# Patient Record
Sex: FEMALE | Race: WHITE | NOT HISPANIC OR LATINO | Employment: FULL TIME | URBAN - METROPOLITAN AREA
[De-identification: names, ages, dates, MRNs, and addresses within clinical notes are randomized per-mention and may not be internally consistent; named-entity substitution may affect disease eponyms.]

---

## 2019-12-12 ENCOUNTER — OFFICE VISIT (OUTPATIENT)
Dept: INTERNAL MEDICINE CLINIC | Facility: CLINIC | Age: 49
End: 2019-12-12
Payer: COMMERCIAL

## 2019-12-12 VITALS
TEMPERATURE: 99.2 F | DIASTOLIC BLOOD PRESSURE: 84 MMHG | HEIGHT: 65 IN | OXYGEN SATURATION: 96 % | HEART RATE: 104 BPM | SYSTOLIC BLOOD PRESSURE: 120 MMHG | BODY MASS INDEX: 29.99 KG/M2 | WEIGHT: 180 LBS

## 2019-12-12 DIAGNOSIS — N90.7 EPIDERMOID CYST OF SKIN OF VULVA: Primary | ICD-10-CM

## 2019-12-12 DIAGNOSIS — J01.00 ACUTE NON-RECURRENT MAXILLARY SINUSITIS: ICD-10-CM

## 2019-12-12 PROCEDURE — 99202 OFFICE O/P NEW SF 15 MIN: CPT | Performed by: INTERNAL MEDICINE

## 2019-12-12 RX ORDER — CEPHALEXIN 500 MG/1
500 CAPSULE ORAL EVERY 6 HOURS SCHEDULED
Qty: 40 CAPSULE | Refills: 0 | Status: SHIPPED | OUTPATIENT
Start: 2019-12-12 | End: 2019-12-22

## 2019-12-12 NOTE — PATIENT INSTRUCTIONS
Problem List Items Addressed This Visit        Respiratory    Acute non-recurrent maxillary sinusitis     Will treat with Keflex 500 mg every 6 hours for 10 days  Recommend continuing over-the-counter second-generation antihistamine and start a steroidal nasal spray and a decongestant  Discussed steam inhalation  She is to contact office for worsening symptoms  Genitourinary    Epidermoid cyst of skin of vulva - Primary     Will treat with Keflex 500 mg to be taken every 6 hours for 10 days  Cyst ruptured  Discussed local wound care

## 2019-12-12 NOTE — PROGRESS NOTES
Assessment/Plan:    Acute non-recurrent maxillary sinusitis  Will treat with Keflex 500 mg every 6 hours for 10 days  Recommend continuing over-the-counter second-generation antihistamine and start a steroidal nasal spray and a decongestant  Discussed steam inhalation  She is to contact office for worsening symptoms  Epidermoid cyst of skin of vulva  Will treat with Keflex 500 mg to be taken every 6 hours for 10 days  Cyst ruptured  Discussed local wound care  Diagnoses and all orders for this visit:    Epidermoid cyst of skin of vulva  -     cephalexin (KEFLEX) 500 mg capsule; Take 1 capsule (500 mg total) by mouth every 6 (six) hours for 10 days    Acute non-recurrent maxillary sinusitis  -     cephalexin (KEFLEX) 500 mg capsule; Take 1 capsule (500 mg total) by mouth every 6 (six) hours for 10 days                Subjective:      Patient ID: Eunice Client is a 52 y o  female  Chief Complaint   Patient presents with    Sinusitis     New patient acute issues - C/O sinus headaches and face pain, coughing     Recurrent Skin Infections     in groij area wants looked at due to pressure and discomfort       52year old female is seen today with concern for acute sinusitis of just over 1 week duration  She also reports having a boil of her groin area that occurs frequently  The area is sore, denies any active drainage  She has needed I&D in the past and requiring antibiotics  Sinusitis   This is a new problem  The current episode started 1 to 4 weeks ago  The problem is unchanged  There has been no fever  She is experiencing no pain  Associated symptoms include congestion, coughing, headaches and sinus pressure  Pertinent negatives include no chills, diaphoresis, ear pain, hoarse voice, neck pain, shortness of breath, sneezing, sore throat or swollen glands  Past treatments include oral decongestants  The treatment provided mild relief         The following portions of the patient's history were reviewed and updated as appropriate: allergies, current medications, past family history, past medical history, past social history, past surgical history and problem list     Review of Systems   Constitutional: Negative for activity change, appetite change, chills, diaphoresis, fatigue and fever  HENT: Positive for congestion and sinus pressure  Negative for ear pain, hoarse voice, postnasal drip, rhinorrhea, sinus pain, sneezing and sore throat  Eyes: Negative for visual disturbance  Respiratory: Positive for cough  Negative for apnea, choking, chest tightness, shortness of breath and wheezing  Cardiovascular: Negative for chest pain, palpitations and leg swelling  Gastrointestinal: Negative for abdominal distention, abdominal pain, anal bleeding, blood in stool, constipation, diarrhea, nausea and vomiting  Endocrine: Negative for cold intolerance and heat intolerance  Genitourinary: Negative for difficulty urinating, dysuria and hematuria  Musculoskeletal: Negative  Negative for neck pain  Skin: Positive for wound  Neurological: Positive for headaches  Negative for dizziness, weakness, light-headedness and numbness  Hematological: Negative for adenopathy  Psychiatric/Behavioral: Negative for agitation, sleep disturbance and suicidal ideas  All other systems reviewed and are negative          Past Medical History:   Diagnosis Date    Anxiety     Asthma          Current Outpatient Medications:     cephalexin (KEFLEX) 500 mg capsule, Take 1 capsule (500 mg total) by mouth every 6 (six) hours for 10 days, Disp: 40 capsule, Rfl: 0    No Known Allergies    Social History   Past Surgical History:   Procedure Laterality Date    WISDOM TOOTH EXTRACTION       Family History   Problem Relation Age of Onset    Diabetes Mother        Objective:  /84 (BP Location: Left arm, Patient Position: Sitting, Cuff Size: Large)   Pulse 104   Temp 99 2 °F (37 3 °C) (Oral)   Ht 5' 5" (1 651 m) Wt 81 6 kg (180 lb)   SpO2 96%   BMI 29 95 kg/m²     No results found for this or any previous visit (from the past 1344 hour(s))  Physical Exam   Constitutional: She is oriented to person, place, and time  She appears well-developed and well-nourished  No distress  HENT:   Head: Normocephalic and atraumatic  Nose: Mucosal edema and rhinorrhea present  Mouth/Throat: Posterior oropharyngeal erythema present  Eyes: Pupils are equal, round, and reactive to light  Conjunctivae and EOM are normal  Right eye exhibits no discharge  Left eye exhibits no discharge  Neck: Normal range of motion  Neck supple  No JVD present  No thyromegaly present  Cardiovascular: Normal rate, regular rhythm, normal heart sounds and intact distal pulses  Exam reveals no gallop and no friction rub  No murmur heard  Pulmonary/Chest: Effort normal and breath sounds normal  No respiratory distress  She has no wheezes  She has no rales  She exhibits no tenderness  Abdominal: Soft  She exhibits no distension  There is no tenderness  Genitourinary:         Musculoskeletal: Normal range of motion  She exhibits no edema, tenderness or deformity  Lymphadenopathy:     She has no cervical adenopathy  Neurological: She is alert and oriented to person, place, and time  No cranial nerve deficit  Coordination normal    Skin: Skin is warm and dry  No rash noted  She is not diaphoretic  No erythema  No pallor  Psychiatric: She has a normal mood and affect  Her behavior is normal  Judgment and thought content normal    Nursing note and vitals reviewed

## 2019-12-12 NOTE — LETTER
December 12, 2019     Patient: Willette Kawasaki   YOB: 1970   Date of Visit: 12/12/2019       To Whom it May Concern:    Willette Kawasaki is under my professional care  She was seen in my office on 12/12/2019  She may return to work on 12/16/2019  If you have any questions or concerns, please don't hesitate to call           Sincerely,          Iva Yee MD        CC: No Recipients

## 2019-12-12 NOTE — ASSESSMENT & PLAN NOTE
Will treat with Keflex 500 mg to be taken every 6 hours for 10 days  Cyst ruptured  Discussed local wound care

## 2019-12-12 NOTE — ASSESSMENT & PLAN NOTE
Will treat with Keflex 500 mg every 6 hours for 10 days  Recommend continuing over-the-counter second-generation antihistamine and start a steroidal nasal spray and a decongestant  Discussed steam inhalation  She is to contact office for worsening symptoms

## 2020-02-11 ENCOUNTER — OFFICE VISIT (OUTPATIENT)
Dept: INTERNAL MEDICINE CLINIC | Facility: CLINIC | Age: 50
End: 2020-02-11
Payer: COMMERCIAL

## 2020-02-11 VITALS
OXYGEN SATURATION: 98 % | HEART RATE: 85 BPM | SYSTOLIC BLOOD PRESSURE: 128 MMHG | HEIGHT: 65 IN | WEIGHT: 179.2 LBS | BODY MASS INDEX: 29.85 KG/M2 | DIASTOLIC BLOOD PRESSURE: 86 MMHG | TEMPERATURE: 98 F

## 2020-02-11 DIAGNOSIS — J01.00 ACUTE MAXILLARY SINUSITIS, RECURRENCE NOT SPECIFIED: ICD-10-CM

## 2020-02-11 DIAGNOSIS — Z12.31 ENCOUNTER FOR SCREENING MAMMOGRAM FOR MALIGNANT NEOPLASM OF BREAST: Primary | ICD-10-CM

## 2020-02-11 PROCEDURE — 99213 OFFICE O/P EST LOW 20 MIN: CPT | Performed by: INTERNAL MEDICINE

## 2020-02-11 PROCEDURE — 3008F BODY MASS INDEX DOCD: CPT | Performed by: INTERNAL MEDICINE

## 2020-02-11 RX ORDER — AZITHROMYCIN 250 MG/1
TABLET, FILM COATED ORAL
Qty: 6 TABLET | Refills: 0 | Status: SHIPPED | OUTPATIENT
Start: 2020-02-11 | End: 2020-02-16

## 2020-02-11 NOTE — PROGRESS NOTES
Assessment/Plan:    1  Acute maxillary sinusitis  Will prescribe Z-Jesse  Also advised to continue with antihistamine  Also advised for steam inhalation  Tylenol p r n  For pain fever and discomfort    2  Chronic tobacco abuse  She smokes about a pack per day  Advised to quit smoking  But presently she is not ready  Diagnoses and all orders for this visit:    Encounter for screening mammogram for malignant neoplasm of breast  -     Mammo screening bilateral w 3d & cad; Future    Acute maxillary sinusitis, recurrence not specified  -     azithromycin (ZITHROMAX) 250 mg tablet; Take 2 tablets (500 mg total) by mouth daily for 1 day, THEN 1 tablet (250 mg total) daily for 4 days  BMI Counseling: Body mass index is 29 82 kg/m²  The BMI is above normal  Nutrition recommendations include decreasing portion sizes, encouraging healthy choices of fruits and vegetables, decreasing fast food intake, consuming healthier snacks, limiting drinks that contain sugar, moderation in carbohydrate intake and increasing intake of lean protein  Exercise recommendations include moderate physical activity 150 minutes/week and exercising 3-5 times per week  No pharmacotherapy was ordered  Tobacco Cessation Counseling: Tobacco cessation counseling was not provided  The patient is sincerely urged to quit consumption of tobacco  She is ready to quit tobacco  Medication options and side effects of medication discussed  Patient refused medication  Subjective:          Patient ID: Cristiane Alcantara is a 52 y o  female  Patient came to office with a complain of a cough with greenish sputum  Sinus congestion sore throat and pressure in the ears  She did try over-the-counter cold medicine without any significant relief        The following portions of the patient's history were reviewed and updated as appropriate: allergies, current medications, past family history, past medical history, past social history, past surgical history and problem list     Review of Systems   Constitutional: Negative for fatigue and fever  HENT: Positive for congestion, ear pain and sore throat  Negative for ear discharge, postnasal drip, sinus pressure, tinnitus and trouble swallowing  Eyes: Negative for discharge, itching and visual disturbance  Respiratory: Positive for cough  Negative for shortness of breath  Cardiovascular: Negative for chest pain and palpitations  Gastrointestinal: Negative for abdominal pain, diarrhea, nausea and vomiting  Endocrine: Negative for cold intolerance and polyuria  Genitourinary: Negative for difficulty urinating, dysuria and urgency  Musculoskeletal: Negative for arthralgias and neck pain  Skin: Negative for rash  Allergic/Immunologic: Negative for environmental allergies  Neurological: Negative for dizziness, weakness and headaches  Psychiatric/Behavioral: The patient is not nervous/anxious  Past Medical History:   Diagnosis Date    Anxiety     Asthma          Current Outpatient Medications:     azithromycin (ZITHROMAX) 250 mg tablet, Take 2 tablets (500 mg total) by mouth daily for 1 day, THEN 1 tablet (250 mg total) daily for 4 days  , Disp: 6 tablet, Rfl: 0    Allergies   Allergen Reactions    Keflex [Cephalexin] Diarrhea    Moxifloxacin GI Intolerance       Social History   Past Surgical History:   Procedure Laterality Date    WISDOM TOOTH EXTRACTION       Family History   Problem Relation Age of Onset    Diabetes Mother        Objective:  /86 (BP Location: Left arm, Patient Position: Sitting, Cuff Size: Adult)   Pulse 85   Temp 98 °F (36 7 °C) (Oral)   Ht 5' 5" (1 651 m)   Wt 81 3 kg (179 lb 3 2 oz) Comment: w shoes  SpO2 98% Comment: ra  BMI 29 82 kg/m²   Body mass index is 29 82 kg/m²  Physical Exam   Constitutional: She appears well-developed  HENT:   Head: Normocephalic     Right Ear: External ear normal    Left Ear: External ear normal    Mouth/Throat: Oropharynx is clear and moist    Mild tenderness over bilateral maxillary sinus noted  Mild pharyngeal congestion noted  No exudate  Eyes: Pupils are equal, round, and reactive to light  No scleral icterus  Neck: Normal range of motion  Neck supple  No tracheal deviation present  No thyromegaly present  Cardiovascular: Normal rate, regular rhythm and normal heart sounds  Pulmonary/Chest: Effort normal and breath sounds normal  No respiratory distress  She has no wheezes  She exhibits no tenderness  Abdominal: Soft  Bowel sounds are normal  She exhibits no mass  There is no tenderness  Musculoskeletal: Normal range of motion  Lymphadenopathy:     She has no cervical adenopathy  Neurological: She is alert  No cranial nerve deficit  Skin: Skin is warm  Psychiatric: She has a normal mood and affect

## 2020-02-13 ENCOUNTER — TELEPHONE (OUTPATIENT)
Dept: ADMINISTRATIVE | Facility: OTHER | Age: 50
End: 2020-02-13

## 2020-02-13 NOTE — TELEPHONE ENCOUNTER
----- Message from Frank Powell sent at 2/13/2020  1:49 PM EST -----  Regarding: PAP  02/13/20 1:51 PM    Hello, our patient Abhinav Champion has had Pap Smear (HPV) aka Cervical Cancer Screening completed/performed  Please assist in updating the patient chart by pulling the Care Everywhere (CE) document  The date of service is 2016       Thank you,  Frank Powell  [address]

## 2020-02-13 NOTE — TELEPHONE ENCOUNTER
Upon review of the In Basket request we were able to locate, review, and update the patient chart as requested for Pap Smear (HPV) aka Cervical Cancer Screening  Any additional questions or concerns should be emailed to the Practice Liaisons via [email or web address] email, please do not reply via In Basket      Thank you  Miriam Denson

## 2020-05-19 ENCOUNTER — TELEMEDICINE (OUTPATIENT)
Dept: INTERNAL MEDICINE CLINIC | Facility: CLINIC | Age: 50
End: 2020-05-19
Payer: COMMERCIAL

## 2020-05-19 VITALS — BODY MASS INDEX: 30.82 KG/M2 | HEIGHT: 65 IN | WEIGHT: 185 LBS

## 2020-05-19 DIAGNOSIS — L72.0 EPIDERMOID CYST: Primary | ICD-10-CM

## 2020-05-19 PROBLEM — J01.00 ACUTE NON-RECURRENT MAXILLARY SINUSITIS: Status: RESOLVED | Noted: 2019-12-12 | Resolved: 2020-05-19

## 2020-05-19 PROCEDURE — 3008F BODY MASS INDEX DOCD: CPT | Performed by: INTERNAL MEDICINE

## 2020-05-19 PROCEDURE — 99213 OFFICE O/P EST LOW 20 MIN: CPT | Performed by: INTERNAL MEDICINE

## 2021-01-15 ENCOUNTER — TELEMEDICINE (OUTPATIENT)
Dept: INTERNAL MEDICINE CLINIC | Age: 51
End: 2021-01-15
Payer: COMMERCIAL

## 2021-01-15 ENCOUNTER — TELEPHONE (OUTPATIENT)
Dept: INTERNAL MEDICINE CLINIC | Age: 51
End: 2021-01-15

## 2021-01-15 VITALS — WEIGHT: 185 LBS | BODY MASS INDEX: 30.82 KG/M2 | HEIGHT: 65 IN | TEMPERATURE: 97.5 F

## 2021-01-15 DIAGNOSIS — Z20.822 EXPOSURE TO COVID-19 VIRUS: Primary | ICD-10-CM

## 2021-01-15 DIAGNOSIS — Z20.822 EXPOSURE TO COVID-19 VIRUS: ICD-10-CM

## 2021-01-15 PROCEDURE — 3725F SCREEN DEPRESSION PERFORMED: CPT | Performed by: FAMILY MEDICINE

## 2021-01-15 PROCEDURE — 3008F BODY MASS INDEX DOCD: CPT | Performed by: FAMILY MEDICINE

## 2021-01-15 PROCEDURE — U0003 INFECTIOUS AGENT DETECTION BY NUCLEIC ACID (DNA OR RNA); SEVERE ACUTE RESPIRATORY SYNDROME CORONAVIRUS 2 (SARS-COV-2) (CORONAVIRUS DISEASE [COVID-19]), AMPLIFIED PROBE TECHNIQUE, MAKING USE OF HIGH THROUGHPUT TECHNOLOGIES AS DESCRIBED BY CMS-2020-01-R: HCPCS | Performed by: FAMILY MEDICINE

## 2021-01-15 PROCEDURE — 99213 OFFICE O/P EST LOW 20 MIN: CPT | Performed by: FAMILY MEDICINE

## 2021-01-15 PROCEDURE — U0005 INFEC AGEN DETEC AMPLI PROBE: HCPCS | Performed by: FAMILY MEDICINE

## 2021-01-15 NOTE — PROGRESS NOTES
COVID-19 Virtual Visit     Assessment/Plan:    Problem List Items Addressed This Visit     None      Visit Diagnoses     Exposure to COVID-19 virus    -  Primary    Relevant Orders    Novel Coronavirus ([id number]), PCR [facility] Collected at [facility] or [facility] Plan     Encounter provider Nohemi Aranda DO    Provider located at [address] [facility] [address]    Recent Visits  No visits were found meeting these conditions  Showing recent visits within past 7 days and meeting all other requirements     Today's Visits  Date Type Provider Dept   01/15/21 [address], DO [facility]   Showing today's visits and meeting all other requirements     Future Appointments  No visits were found meeting these conditions  Showing future appointments within next 150 days and meeting all other requirements      COVID-19 HPI        was in the car 1 week ago with someone who later tested positive for COVID, she is having her usual sinus symptoms  No fevers, no cough no SOB, no loss of taste or smell        No results found for: Delmon Crimes, SARSCORONAVI, CORONAVIRUSR  Past Medical History:   Diagnosis Date    Anxiety     Asthma      Past Surgical History:   Procedure Laterality Date    WISDOM TOOTH EXTRACTION       No current outpatient medications on file  No current facility-administered medications for this visit        Allergies   Allergen Reactions    Keflex [Cephalexin] Diarrhea    Moxifloxacin GI Intolerance       Review of Systems     Constitutional:  Denies fever or chills   Eyes:  Denies double or blurry vision  HENT:  Denies nasal congestion or sore throat   Respiratory:  Denies cough or shortness of breath or wheezing  Cardiovascular:  Denies palpitations or chest pain  GI:  Denies abdominal pain, nausea, or vomiting  Integument:  Denies rash , no open areas  Neurologic:  Denies headache or focal weakness        Objective:    Vitals:    01/15/21 1101   Temp: 97 5 °F (36 4 °C)   Weight: 83 9 kg (185 lb)   Height: 5' 5" (1 651 m)       Physical Exam       Constitutional:  Well developed, well nourished, no acute distress, non-toxic appearance   Eyes:  Is conjunctiva normal , non icteric sclera  HENT:  Atraumatic, non congested  Respiratory:  Nonlabored, appears comfortable, no conversational dyspnea  Cardiovascular:   no LE edema b/l  Neurologic:  no focal deficits noted   Psychiatric:  Speech and behavior appropriate , AAO x 3        VIRTUAL VISIT DISCLAIMER    Opal Chantelle acknowledges that she has consented to an online visit or consultation  She understands that the online visit is based solely on information provided by her, and that, in the absence of a face-to-face physical evaluation by the physician, the diagnosis she receives is both limited and provisional in terms of accuracy and completeness  This is not intended to replace a full medical face-to-face evaluation by the physician  Yael Lockhart understands and accepts these terms

## 2021-01-16 LAB — SARS-COV-2 RNA SPEC QL NAA+PROBE: NOT DETECTED

## 2021-12-29 ENCOUNTER — OFFICE VISIT (OUTPATIENT)
Dept: INTERNAL MEDICINE CLINIC | Facility: OTHER | Age: 51
End: 2021-12-29
Payer: COMMERCIAL

## 2021-12-29 VITALS
DIASTOLIC BLOOD PRESSURE: 82 MMHG | WEIGHT: 209 LBS | SYSTOLIC BLOOD PRESSURE: 134 MMHG | OXYGEN SATURATION: 99 % | HEIGHT: 65 IN | RESPIRATION RATE: 20 BRPM | HEART RATE: 86 BPM | BODY MASS INDEX: 34.82 KG/M2 | TEMPERATURE: 97.9 F

## 2021-12-29 DIAGNOSIS — Z12.31 ENCOUNTER FOR SCREENING MAMMOGRAM FOR MALIGNANT NEOPLASM OF BREAST: ICD-10-CM

## 2021-12-29 DIAGNOSIS — Z12.11 SCREENING FOR COLON CANCER: ICD-10-CM

## 2021-12-29 DIAGNOSIS — M79.671 RIGHT FOOT PAIN: ICD-10-CM

## 2021-12-29 DIAGNOSIS — Z13.220 ENCOUNTER FOR LIPID SCREENING FOR CARDIOVASCULAR DISEASE: ICD-10-CM

## 2021-12-29 DIAGNOSIS — Z82.61 FAMILY HISTORY OF RHEUMATOID ARTHRITIS: ICD-10-CM

## 2021-12-29 DIAGNOSIS — M25.50 POLYARTHRALGIA: Primary | ICD-10-CM

## 2021-12-29 DIAGNOSIS — Z83.71 FAMILY HX COLONIC POLYPS: ICD-10-CM

## 2021-12-29 DIAGNOSIS — Z13.6 ENCOUNTER FOR LIPID SCREENING FOR CARDIOVASCULAR DISEASE: ICD-10-CM

## 2021-12-29 PROCEDURE — 99213 OFFICE O/P EST LOW 20 MIN: CPT | Performed by: INTERNAL MEDICINE

## 2021-12-29 RX ORDER — MELOXICAM 7.5 MG/1
7.5 TABLET ORAL DAILY PRN
Qty: 30 TABLET | Refills: 0 | Status: SHIPPED | OUTPATIENT
Start: 2021-12-29 | End: 2022-03-04

## 2022-01-03 ENCOUNTER — HOSPITAL ENCOUNTER (OUTPATIENT)
Dept: RADIOLOGY | Facility: IMAGING CENTER | Age: 52
Discharge: HOME | End: 2022-01-03
Payer: COMMERCIAL

## 2022-01-03 ENCOUNTER — APPOINTMENT (OUTPATIENT)
Dept: LAB | Facility: IMAGING CENTER | Age: 52
End: 2022-01-03
Payer: COMMERCIAL

## 2022-01-03 VITALS — WEIGHT: 208 LBS | BODY MASS INDEX: 34.66 KG/M2 | HEIGHT: 65 IN

## 2022-01-03 DIAGNOSIS — Z82.61 FAMILY HISTORY OF RHEUMATOID ARTHRITIS: ICD-10-CM

## 2022-01-03 DIAGNOSIS — Z12.31 ENCOUNTER FOR SCREENING MAMMOGRAM FOR MALIGNANT NEOPLASM OF BREAST: ICD-10-CM

## 2022-01-03 DIAGNOSIS — M79.671 RIGHT FOOT PAIN: ICD-10-CM

## 2022-01-03 DIAGNOSIS — Z13.6 ENCOUNTER FOR LIPID SCREENING FOR CARDIOVASCULAR DISEASE: ICD-10-CM

## 2022-01-03 DIAGNOSIS — M25.50 POLYARTHRALGIA: ICD-10-CM

## 2022-01-03 DIAGNOSIS — Z13.220 ENCOUNTER FOR LIPID SCREENING FOR CARDIOVASCULAR DISEASE: ICD-10-CM

## 2022-01-03 LAB
ALBUMIN SERPL BCP-MCNC: 4 G/DL (ref 3.5–5)
ALP SERPL-CCNC: 50 U/L (ref 46–116)
ALT SERPL W P-5'-P-CCNC: 23 U/L (ref 12–78)
ANION GAP SERPL CALCULATED.3IONS-SCNC: 4 MMOL/L (ref 4–13)
AST SERPL W P-5'-P-CCNC: 11 U/L (ref 5–45)
BILIRUB SERPL-MCNC: 0.52 MG/DL (ref 0.2–1)
BUN SERPL-MCNC: 16 MG/DL (ref 5–25)
CALCIUM SERPL-MCNC: 9.5 MG/DL (ref 8.3–10.1)
CHLORIDE SERPL-SCNC: 110 MMOL/L (ref 100–108)
CHOLEST SERPL-MCNC: 207 MG/DL
CO2 SERPL-SCNC: 25 MMOL/L (ref 21–32)
CREAT SERPL-MCNC: 0.66 MG/DL (ref 0.6–1.3)
CRP SERPL QL: <3 MG/L
ERYTHROCYTE [DISTWIDTH] IN BLOOD BY AUTOMATED COUNT: 13.3 % (ref 11.6–15.1)
GFR SERPL CREATININE-BSD FRML MDRD: 102 ML/MIN/1.73SQ M
GLUCOSE P FAST SERPL-MCNC: 108 MG/DL (ref 65–99)
HCT VFR BLD AUTO: 45.6 % (ref 34.8–46.1)
HDLC SERPL-MCNC: 66 MG/DL
HGB BLD-MCNC: 15.1 G/DL (ref 11.5–15.4)
LDLC SERPL CALC-MCNC: 132 MG/DL (ref 0–100)
MCH RBC QN AUTO: 32.2 PG (ref 26.8–34.3)
MCHC RBC AUTO-ENTMCNC: 33.1 G/DL (ref 31.4–37.4)
MCV RBC AUTO: 97 FL (ref 82–98)
NONHDLC SERPL-MCNC: 141 MG/DL
PLATELET # BLD AUTO: 334 THOUSANDS/UL (ref 149–390)
PMV BLD AUTO: 9.5 FL (ref 8.9–12.7)
POTASSIUM SERPL-SCNC: 4.1 MMOL/L (ref 3.5–5.3)
PROT SERPL-MCNC: 6.8 G/DL (ref 6.4–8.2)
RBC # BLD AUTO: 4.69 MILLION/UL (ref 3.81–5.12)
SODIUM SERPL-SCNC: 139 MMOL/L (ref 136–145)
TRIGL SERPL-MCNC: 43 MG/DL
WBC # BLD AUTO: 6.04 THOUSAND/UL (ref 4.31–10.16)

## 2022-01-03 PROCEDURE — 86038 ANTINUCLEAR ANTIBODIES: CPT

## 2022-01-03 PROCEDURE — 86140 C-REACTIVE PROTEIN: CPT

## 2022-01-03 PROCEDURE — 85027 COMPLETE CBC AUTOMATED: CPT

## 2022-01-03 PROCEDURE — 73630 X-RAY EXAM OF FOOT: CPT

## 2022-01-03 PROCEDURE — 77063 BREAST TOMOSYNTHESIS BI: CPT

## 2022-01-03 PROCEDURE — 80061 LIPID PANEL: CPT

## 2022-01-03 PROCEDURE — 80053 COMPREHEN METABOLIC PANEL: CPT

## 2022-01-03 PROCEDURE — 86430 RHEUMATOID FACTOR TEST QUAL: CPT

## 2022-01-03 PROCEDURE — 77067 SCR MAMMO BI INCL CAD: CPT

## 2022-01-03 PROCEDURE — 86200 CCP ANTIBODY: CPT

## 2022-01-03 PROCEDURE — 36415 COLL VENOUS BLD VENIPUNCTURE: CPT

## 2022-01-04 LAB
CCP AB SER IA-ACNC: 1.8
RHEUMATOID FACT SER QL LA: NEGATIVE
RYE IGE QN: NEGATIVE

## 2022-01-06 ENCOUNTER — TELEPHONE (OUTPATIENT)
Dept: INTERNAL MEDICINE CLINIC | Facility: OTHER | Age: 52
End: 2022-01-06

## 2022-01-06 DIAGNOSIS — M25.50 POLYARTHRALGIA: Primary | ICD-10-CM

## 2022-01-06 NOTE — TELEPHONE ENCOUNTER
Patient received your message about her lab results, but she is stating that her hands still are not right and she was looking for you to refer her somewhere to have it checked out

## 2022-02-28 ENCOUNTER — EVALUATION (OUTPATIENT)
Dept: PHYSICAL THERAPY | Facility: REHABILITATION | Age: 52
End: 2022-02-28
Payer: COMMERCIAL

## 2022-02-28 DIAGNOSIS — G89.29 CHRONIC PAIN OF RIGHT ANKLE: ICD-10-CM

## 2022-02-28 DIAGNOSIS — M19.071 OSTEOARTHRITIS OF RIGHT FOOT, UNSPECIFIED OSTEOARTHRITIS TYPE: ICD-10-CM

## 2022-02-28 DIAGNOSIS — M25.571 CHRONIC PAIN OF RIGHT ANKLE: ICD-10-CM

## 2022-02-28 DIAGNOSIS — M19.071 OSTEOARTHRITIS OF RIGHT ANKLE, UNSPECIFIED OSTEOARTHRITIS TYPE: Primary | ICD-10-CM

## 2022-02-28 DIAGNOSIS — M79.671 RIGHT FOOT PAIN: ICD-10-CM

## 2022-02-28 PROCEDURE — 97110 THERAPEUTIC EXERCISES: CPT | Performed by: PHYSICAL THERAPIST

## 2022-02-28 PROCEDURE — 97162 PT EVAL MOD COMPLEX 30 MIN: CPT | Performed by: PHYSICAL THERAPIST

## 2022-02-28 NOTE — LETTER
2022    Anu Kellogg, [address]    Patient: Olegario Castaneda   YOB: 1970   Date of Visit: 2022     Encounter Diagnosis     ICD-10-CM    1  Osteoarthritis of right ankle, unspecified osteoarthritis type  M19 071    2  Osteoarthritis of right foot, unspecified osteoarthritis type  M19 071    3  Chronic pain of right ankle  M25 571     G89 29    4  Right foot pain  M79 671        Dear Dr Arina Salgado: Thank you for your recent referral of Olegario Castaneda  Please review the attached evaluation summary from Hanh's recent visit  Please verify that you agree with the plan of care by signing the attached order  If you have any questions or concerns, please do not hesitate to call  I sincerely appreciate the opportunity to share in the care of one of your patients and hope to have another opportunity to work with you in the near future  Sincerely,    Ileana Babcock, PT      Referring Provider:      I certify that I have read the below Plan of Care and certify the need for these services furnished under this plan of treatment while under my care  Anu Kellogg DPM  [address] [address]  Via Fax: [phone]          PT Evaluation     Today's date: 2022  Patient name: Olegario Castaneda  : 1970  MRN: [id number]  Referring provider: Silvia Hollis DPM  Dx:   Encounter Diagnosis     ICD-10-CM    1  Osteoarthritis of right ankle, unspecified osteoarthritis type  M19 071    2  Osteoarthritis of right foot, unspecified osteoarthritis type  M19 071    3  Chronic pain of right ankle  M25 571     G89 29    4  Right foot pain  M79 671                   Assessment  Assessment details: Pt is a 46year old female presenting to PT with 1 year history of right ankle and midfoot pain   Pt presents with pain, decreased range of motion, decreased strength, abnormal gait mechanics, impaired static and dynamic balance, as well as decreased tolerance to activity  Pt with signs and symptoms consistent with right midfoot osteoarthritis and plantar fasciitis  Pt is a good candidate for outpatient physical therapy and would benefit from skilled physical therapy to address limitations and to achieve goals  Thank you for this referral    Impairments: abnormal coordination, abnormal gait, abnormal or restricted ROM, activity intolerance, impaired balance, impaired physical strength, pain with function and weight-bearing intolerance  Understanding of Dx/Px/POC: good   Prognosis: good    Goals  ST  Patient will be able to ambulate community distances without limitation or gait abnormalities in 4 weeks  2  Patient will demonstrate 25% improvement in ROM in 4 weeks  3  Patient will demonstrate 1/2 grade improvement in strength in 4 weeks  LT  Patient will be able to perform IADLS without restriction or pain by discharge  2  Patient will be independent in HEP by discharge  3  Patient will be able to return to recreational/work duties without restriction or pain by discharge  Plan  Patient would benefit from: PT eval and skilled PT  Planned modality interventions: cryotherapy and thermotherapy: hydrocollator packs  Planned therapy interventions: IADL retraining, body mechanics training, flexibility, functional ROM exercises, home exercise program, neuromuscular re-education, manual therapy, postural training, strengthening, stretching, therapeutic activities, therapeutic exercise, joint mobilization, balance/weight bearing training and patient education  Frequency: 2x week  Duration in visits: 8  Duration in weeks: 4  Treatment plan discussed with: patient        Subjective Evaluation    History of Present Illness  Mechanism of injury: Pt is a 46year old female presenting to PT with 1 year history of right ankle and foot pain  Pt denies any injury, trauma or preceding event   Pt went to   [facility], x-rays performed and negative for fracture  Pt reports she will require surgery for re-alignment of foot at some point in the future  Pt prescribed Meloxicam for pain which she is no longer taking due to stomach upset  Pt was fitted for custom orthotics, will be receiving them next week  Pt referred to OPPT  Pt works FT at [employer] at [employer]      Pain Location: right ankle and dorsal aspect of right foot    Pain Type: varies, sharp, shooting, aching, throbbing, numbness/tingling    Pain Intensity:  Current: 1  Best: 1  Worst: 8    Pt reports increased pain and/or difficulty with: standing, walking, any weight-bearing activities  Pt reports sleep disturbance secondary to pain waking occasionally at night  Pt reports decreased pain with: medication, rest            Recurrent probem    Quality of life: good      Diagnostic Tests  X-ray: abnormal  Patient Goals  Patient goals for therapy: increased strength, independence with ADLs/IADLs, decreased pain, improved balance and increased motion          Objective     Observations     Right Ankle/Foot   Positive for edema  Additional Observation Details  Mild right ankle and localized dorsal right foot edema    Tenderness     Right Ankle/Foot   Tenderness in the dorsum foot, mid-plantar aspect, plantar fascia and tarsals  Neurological Testing     Sensation     Ankle/Foot   Left Ankle/Foot   Intact: light touch    Right Ankle/Foot   Intact: light touch     Active Range of Motion   Left Ankle/Foot   Normal active range of motion    Right Ankle/Foot   Dorsiflexion (ke): 8 degrees with pain  Plantar flexion: 32 degrees with pain  Inversion: 18 degrees with pain  Eversion: 8 degrees with pain    Joint Play     Right Ankle/Foot  Joints within functional limits are the talocrural joint and subtalar joint  Hypermobile in the forefoot  Hypomobile in the midfoot       Strength/Myotome Testing     Left Ankle/Foot   Normal strength    Right Ankle/Foot   Dorsiflexion: 2+  Plantar flexion: 2+  Inversion: 2+  Eversion: 2+    Tests   Left Ankle/Foot   Positive for navicular drop  Negative for windlass  Right Ankle/Foot   Positive for navicular drop and windlass                Precautions: asthma, anxiety     Daily Treatment Diary      Assessment  2/28                     Josephine/Abner BRYANT                     FOTO  54/65       **         **   Manuals    Mateo/BARRETT R Plantar Fascia                                                 Exercise Diary     Bike Warm-Up                        Longsitting Plantar Fascia Stretch with Towel  20"x3                      Seated Plantar Fascia Stretch  20"x3                      Golfball Plantar Fascia Massage  HEP                                                                                                                                                                                                                                                                                                                     Modalities    CP R Ankle/Foot  Post-Tx

## 2022-02-28 NOTE — PROGRESS NOTES
PT Evaluation     Today's date: 2022  Patient name: Traci Wu  : 1970  MRN: [id number]  Referring provider: Derrick Leigh DPM  Dx:   Encounter Diagnosis     ICD-10-CM    1  Osteoarthritis of right ankle, unspecified osteoarthritis type  M19 071    2  Osteoarthritis of right foot, unspecified osteoarthritis type  M19 071    3  Chronic pain of right ankle  M25 571     G89 29    4  Right foot pain  M79 671                   Assessment  Assessment details: Pt is a 46year old female presenting to PT with 1 year history of right ankle and midfoot pain  Pt presents with pain, decreased range of motion, decreased strength, abnormal gait mechanics, impaired static and dynamic balance, as well as decreased tolerance to activity  Pt with signs and symptoms consistent with right midfoot osteoarthritis and plantar fasciitis  Pt is a good candidate for outpatient physical therapy and would benefit from skilled physical therapy to address limitations and to achieve goals  Thank you for this referral    Impairments: abnormal coordination, abnormal gait, abnormal or restricted ROM, activity intolerance, impaired balance, impaired physical strength, pain with function and weight-bearing intolerance  Understanding of Dx/Px/POC: good   Prognosis: good    Goals  ST  Patient will be able to ambulate community distances without limitation or gait abnormalities in 4 weeks  2  Patient will demonstrate 25% improvement in ROM in 4 weeks  3  Patient will demonstrate 1/2 grade improvement in strength in 4 weeks  LT  Patient will be able to perform IADLS without restriction or pain by discharge  2  Patient will be independent in HEP by discharge  3  Patient will be able to return to recreational/work duties without restriction or pain by discharge        Plan  Patient would benefit from: PT eval and skilled PT  Planned modality interventions: cryotherapy and thermotherapy: hydrocollator packs  Planned therapy interventions: IADL retraining, body mechanics training, flexibility, functional ROM exercises, home exercise program, neuromuscular re-education, manual therapy, postural training, strengthening, stretching, therapeutic activities, therapeutic exercise, joint mobilization, balance/weight bearing training and patient education  Frequency: 2x week  Duration in visits: 8  Duration in weeks: 4  Treatment plan discussed with: patient        Subjective Evaluation    History of Present Illness  Mechanism of injury: Pt is a 46year old female presenting to PT with 1 year history of right ankle and foot pain  Pt denies any injury, trauma or preceding event  Pt went to Dr Tolu Ring for further evaluation, x-rays performed and negative for fracture  Pt reports she will require surgery for re-alignment of foot at some point in the future  Pt prescribed Meloxicam for pain which she is no longer taking due to stomach upset  Pt was fitted for custom orthotics, will be receiving them next week  Pt referred to OPPT  Pt works FT at [employer] at [employer]      Pain Location: right ankle and dorsal aspect of right foot    Pain Type: varies, sharp, shooting, aching, throbbing, numbness/tingling    Pain Intensity:  Current: 1  Best: 1  Worst: 8    Pt reports increased pain and/or difficulty with: standing, walking, any weight-bearing activities  Pt reports sleep disturbance secondary to pain waking occasionally at night  Pt reports decreased pain with: medication, rest            Recurrent probem    Quality of life: good      Diagnostic Tests  X-ray: abnormal  Patient Goals  Patient goals for therapy: increased strength, independence with ADLs/IADLs, decreased pain, improved balance and increased motion          Objective     Observations     Right Ankle/Foot   Positive for edema       Additional Observation Details  Mild right ankle and localized dorsal right foot edema    Tenderness     Right Ankle/Foot   Tenderness in the dorsum foot, mid-plantar aspect, plantar fascia and tarsals  Neurological Testing     Sensation     Ankle/Foot   Left Ankle/Foot   Intact: light touch    Right Ankle/Foot   Intact: light touch     Active Range of Motion   Left Ankle/Foot   Normal active range of motion    Right Ankle/Foot   Dorsiflexion (ke): 8 degrees with pain  Plantar flexion: 32 degrees with pain  Inversion: 18 degrees with pain  Eversion: 8 degrees with pain    Joint Play     Right Ankle/Foot  Joints within functional limits are the talocrural joint and subtalar joint  Hypermobile in the forefoot  Hypomobile in the midfoot  Strength/Myotome Testing     Left Ankle/Foot   Normal strength    Right Ankle/Foot   Dorsiflexion: 2+  Plantar flexion: 2+  Inversion: 2+  Eversion: 2+    Tests   Left Ankle/Foot   Positive for navicular drop  Negative for windlass  Right Ankle/Foot   Positive for navicular drop and windlass                Precautions: asthma, anxiety     Daily Treatment Diary      Assessment  2/28                     Josephine/Abner BRYANT                     FOTO  54/65       **         **   Manuals    Mateo/BARRETT R Plantar Fascia                                                 Exercise Diary     Bike Warm-Up                        Longsitting Plantar Fascia Stretch with Towel  20"x3                      Seated Plantar Fascia Stretch  20"x3                      Golfball Plantar Fascia Massage  HEP                                                                                                                                                                                                                                                                                                                     Modalities    CP R Ankle/Foot  Post-Tx

## 2022-03-04 ENCOUNTER — OFFICE VISIT (OUTPATIENT)
Dept: INTERNAL MEDICINE CLINIC | Facility: OTHER | Age: 52
End: 2022-03-04
Payer: COMMERCIAL

## 2022-03-04 VITALS
WEIGHT: 211 LBS | DIASTOLIC BLOOD PRESSURE: 84 MMHG | OXYGEN SATURATION: 99 % | HEIGHT: 65 IN | HEART RATE: 102 BPM | BODY MASS INDEX: 35.16 KG/M2 | TEMPERATURE: 98.6 F | SYSTOLIC BLOOD PRESSURE: 124 MMHG

## 2022-03-04 DIAGNOSIS — J06.9 VIRAL UPPER RESPIRATORY TRACT INFECTION: Primary | ICD-10-CM

## 2022-03-04 PROCEDURE — 99213 OFFICE O/P EST LOW 20 MIN: CPT | Performed by: NURSE PRACTITIONER

## 2022-03-04 RX ORDER — FLUTICASONE PROPIONATE 50 MCG
1 SPRAY, SUSPENSION (ML) NASAL DAILY
Qty: 16 G | Refills: 1 | Status: SHIPPED | OUTPATIENT
Start: 2022-03-04

## 2022-03-04 NOTE — PATIENT INSTRUCTIONS
Start flonase 2 sprays each nostril once daily  Start Mucinex DM around the clock   Warm steam, showers, hydration, rest    Follow up if symptoms continue to worsen in 1 week  Follow up if you start to develop fevers or worsening chest tightness

## 2022-03-04 NOTE — ASSESSMENT & PLAN NOTE
Start flonase 2 sprays each nostril once daily  Start Mucinex DM around the clock   Warm steam, showers, hydration, rest    Follow up if symptoms continue to worsen in 1 week  Follow up if you start to develop fevers or worsening chest tightness   Lungs clear on exam today

## 2022-03-04 NOTE — PROGRESS NOTES
Assessment/Plan:    Problem List Items Addressed This Visit        Respiratory    Viral upper respiratory tract infection - Primary     Start flonase 2 sprays each nostril once daily  Start Mucinex DM around the clock   Warm steam, showers, hydration, rest    Follow up if symptoms continue to worsen in 1 week  Follow up if you start to develop fevers or worsening chest tightness   Lungs clear on exam today          Relevant Medications    fluticasone (FLONASE) 50 mcg/act nasal spray          M*Modal software was used to dictate this note  It may contain errors with dictating incorrect words or incorrect spelling  Please contact the provider directly with any questions  Subjective:      Patient ID: Ricardo Valdes is a 46 y o  female  HPI     Patient presents today with concern for cold symptoms  Onset was 1 day ago  Symptoms include sneezing, rhinorrhea, nasal congestion, post nasal drip, coughing, sore throat and pressure behind her eyes  She has not tried anything for her symptoms yet  She is a current smoker, smoking 1 ppd     Of note she did have covid in January 2022  The following portions of the patient's history were reviewed and updated as appropriate: allergies, current medications, past family history, past medical history, past social history, past surgical history and problem list     Review of Systems   Constitutional: Negative for appetite change (a little less), chills and fever  HENT: Positive for congestion, postnasal drip, rhinorrhea, sinus pressure, sinus pain, sneezing and sore throat  Negative for ear discharge and ear pain  Eyes: Positive for itching  Respiratory: Positive for cough and chest tightness            Past Medical History:   Diagnosis Date    Anxiety     Asthma          Current Outpatient Medications:     fluticasone (FLONASE) 50 mcg/act nasal spray, 1 spray into each nostril daily, Disp: 16 g, Rfl: 1    Allergies   Allergen Reactions    Keflex [Cephalexin] Diarrhea    Moxifloxacin GI Intolerance       Social History   Past Surgical History:   Procedure Laterality Date    WISDOM TOOTH EXTRACTION       Family History   Problem Relation Age of Onset    Diabetes Mother     No Known Problems Father     No Known Problems Daughter     No Known Problems Maternal Grandmother     No Known Problems Maternal Grandfather     Breast cancer Paternal Grandmother 80    No Known Problems Paternal Grandfather     No Known Problems Maternal Aunt     Cancer Paternal Aunt     No Known Problems Paternal Aunt     No Known Problems Son     No Known Problems Son        Objective:  /84 (BP Location: Left arm, Patient Position: Sitting, Cuff Size: Adult)   Pulse 102 Comment: apical  Temp 98 6 °F (37 °C)   Ht 5' 5" (1 651 m)   Wt 95 7 kg (211 lb)   SpO2 99%   BMI 35 11 kg/m²      Physical Exam  Vitals reviewed  Constitutional:       General: She is not in acute distress  Appearance: Normal appearance  She is obese  She is not diaphoretic  HENT:      Head: Normocephalic and atraumatic  Right Ear: Tympanic membrane and external ear normal       Left Ear: Tympanic membrane and external ear normal       Nose: Rhinorrhea present  Right Sinus: Maxillary sinus tenderness and frontal sinus tenderness present  Left Sinus: Maxillary sinus tenderness and frontal sinus tenderness present  Mouth/Throat:      Mouth: Mucous membranes are moist       Pharynx: Oropharynx is clear  Posterior oropharyngeal erythema (mild) present  Eyes:      Extraocular Movements: Extraocular movements intact  Conjunctiva/sclera: Conjunctivae normal       Pupils: Pupils are equal, round, and reactive to light  Cardiovascular:      Rate and Rhythm: Normal rate and regular rhythm  Heart sounds: Normal heart sounds  Pulmonary:      Effort: Pulmonary effort is normal  No respiratory distress  Breath sounds: Normal breath sounds  No wheezing, rhonchi or rales  Neurological:      Mental Status: She is alert  Mental status is at baseline     Psychiatric:         Mood and Affect: Mood normal          Behavior: Behavior normal

## 2022-03-07 ENCOUNTER — OFFICE VISIT (OUTPATIENT)
Dept: PHYSICAL THERAPY | Facility: REHABILITATION | Age: 52
End: 2022-03-07
Payer: COMMERCIAL

## 2022-03-07 DIAGNOSIS — M25.571 CHRONIC PAIN OF RIGHT ANKLE: ICD-10-CM

## 2022-03-07 DIAGNOSIS — M19.071 OSTEOARTHRITIS OF RIGHT ANKLE, UNSPECIFIED OSTEOARTHRITIS TYPE: ICD-10-CM

## 2022-03-07 DIAGNOSIS — G89.29 CHRONIC PAIN OF RIGHT ANKLE: ICD-10-CM

## 2022-03-07 DIAGNOSIS — M19.071 OSTEOARTHRITIS OF RIGHT FOOT, UNSPECIFIED OSTEOARTHRITIS TYPE: Primary | ICD-10-CM

## 2022-03-07 DIAGNOSIS — M79.671 RIGHT FOOT PAIN: ICD-10-CM

## 2022-03-07 PROCEDURE — 97140 MANUAL THERAPY 1/> REGIONS: CPT | Performed by: PHYSICAL THERAPIST

## 2022-03-07 PROCEDURE — 97110 THERAPEUTIC EXERCISES: CPT | Performed by: PHYSICAL THERAPIST

## 2022-03-07 PROCEDURE — 97112 NEUROMUSCULAR REEDUCATION: CPT | Performed by: PHYSICAL THERAPIST

## 2022-03-07 NOTE — PROGRESS NOTES
Daily Note     Today's date: 3/7/2022  Patient name: Nichol Feldman  : 1970  MRN: [id number]  Referring provider: Fe Hawthorne DPM  Dx:   Encounter Diagnosis     ICD-10-CM    1  Osteoarthritis of right foot, unspecified osteoarthritis type  M19 071    2  Osteoarthritis of right ankle, unspecified osteoarthritis type  M19 071    3  Chronic pain of right ankle  M25 571     G89 29    4  Right foot pain  M79 671                   Subjective: Pt reports she had a severe sinus infection over the past few days, hasn't been working or able to do her exercises at home  Will be returning to work tomorrow  Objective: See treatment diary below  Assessment: Pt with good response to addition of manual techniques without adverse response  Added bike-warm up and strengthening exercises as listed with good tolerance  Will continue to progress program as able  Pt will benefit from continued skilled PT intervention in order to address her remaining limitations and to restore maximal function  Plan: Continue per plan of care  Progress treatment as tolerated         Precautions: asthma, anxiety     Daily Treatment Diary      Assessment  2/28  3/7                   Josephine/Abner BRYANT                     FOTO  54/65       **         **   Manuals    Mateo/BARRETT R Plantar Fascia    MD                                             Exercise Diary     Bike Warm-Up    L1x5'                    Longsitting Plantar Fascia Stretch with Towel  20"x3  30"x3                    Seated Plantar Fascia Stretch  20"x3  30"x3                    Golfball Plantar Fascia Massage  HEP                      Towel Toe Curls    x2                    IV Isometrics - Ball    5"x10                    EV Isometrics - Strap    5"x10 Modalities    CP R Ankle/Foot  Post-Tx    10'

## 2022-03-14 ENCOUNTER — APPOINTMENT (OUTPATIENT)
Dept: PHYSICAL THERAPY | Facility: REHABILITATION | Age: 52
End: 2022-03-14
Payer: COMMERCIAL

## 2022-03-21 ENCOUNTER — OFFICE VISIT (OUTPATIENT)
Dept: PHYSICAL THERAPY | Facility: REHABILITATION | Age: 52
End: 2022-03-21
Payer: COMMERCIAL

## 2022-03-21 DIAGNOSIS — M19.071 OSTEOARTHRITIS OF RIGHT ANKLE, UNSPECIFIED OSTEOARTHRITIS TYPE: ICD-10-CM

## 2022-03-21 DIAGNOSIS — M19.071 OSTEOARTHRITIS OF RIGHT FOOT, UNSPECIFIED OSTEOARTHRITIS TYPE: Primary | ICD-10-CM

## 2022-03-21 PROCEDURE — 97140 MANUAL THERAPY 1/> REGIONS: CPT

## 2022-03-21 PROCEDURE — 97110 THERAPEUTIC EXERCISES: CPT

## 2022-03-21 PROCEDURE — 97112 NEUROMUSCULAR REEDUCATION: CPT

## 2022-03-21 NOTE — PROGRESS NOTES
Daily Note     Today's date: 3/21/2022  Patient name: Rosa Garner  : 1970  MRN: [id number]  Referring provider: Marie Bernal DPM  Dx:   Encounter Diagnosis     ICD-10-CM    1  Osteoarthritis of right foot, unspecified osteoarthritis type  M19 071    2  Osteoarthritis of right ankle, unspecified osteoarthritis type  M19 071                   Subjective:  Omid Bradford reports that she was on her feet a lot yesterday so she felt a little wobbly this am   She does feel the stretches help  Objective: See treatment diary below      Assessment: Patient tolerated treatment well  Patient performed ex and received manual therapy as noted  Provided cues to ensure good ex technique and benefit   + medial plantar fascia tenderness noted with less tingling in her toes per the patient  Patient would benefit from continued PT intervention to address deficits and attain set goals  Plan: Continue per plan of care        Precautions: asthma, anxiety     Daily Treatment Diary      Assessment  2/28  3/7  3/21                 Josephine/Abner BRYANT                     FOTO  54/65       **         **   Manuals    Graston/STM R Plantar Fascia    MD  CC                                           Exercise Diary     Bike Warm-Up    L1x5'  L1 x6'                  Longsitting Plantar Fascia Stretch with Towel  20"x3  30"x3  30"x3                  Seated Plantar Fascia Stretch  20"x3  30"x3  30'x3                  Golfball Plantar Fascia Massage  HEP                      Towel Toe Curls    x2  x2'                  IV Isometrics - Ball    5"x10  5"x15                  EV Isometrics - Strap    5"x10  5"x15                                                                                                                                                                                                                                         Modalities    CP R Ankle/Foot  Post-Tx    10'  10'

## 2022-03-28 ENCOUNTER — OFFICE VISIT (OUTPATIENT)
Dept: PHYSICAL THERAPY | Facility: REHABILITATION | Age: 52
End: 2022-03-28
Payer: COMMERCIAL

## 2022-03-28 DIAGNOSIS — M19.071 OSTEOARTHRITIS OF RIGHT ANKLE, UNSPECIFIED OSTEOARTHRITIS TYPE: ICD-10-CM

## 2022-03-28 DIAGNOSIS — M79.671 RIGHT FOOT PAIN: ICD-10-CM

## 2022-03-28 DIAGNOSIS — M25.571 CHRONIC PAIN OF RIGHT ANKLE: ICD-10-CM

## 2022-03-28 DIAGNOSIS — G89.29 CHRONIC PAIN OF RIGHT ANKLE: ICD-10-CM

## 2022-03-28 DIAGNOSIS — M19.071 OSTEOARTHRITIS OF RIGHT FOOT, UNSPECIFIED OSTEOARTHRITIS TYPE: Primary | ICD-10-CM

## 2022-03-28 PROCEDURE — 97110 THERAPEUTIC EXERCISES: CPT

## 2022-03-28 PROCEDURE — 97112 NEUROMUSCULAR REEDUCATION: CPT

## 2022-03-28 PROCEDURE — 97140 MANUAL THERAPY 1/> REGIONS: CPT

## 2022-03-28 NOTE — PROGRESS NOTES
Daily Note     Today's date: 3/28/2022  Patient name: Deanna James  : 1970  MRN: [id number]  Referring provider: Jolaine Favre, DPM  Dx:   Encounter Diagnosis     ICD-10-CM    1  Osteoarthritis of right foot, unspecified osteoarthritis type  M19 071    2  Osteoarthritis of right ankle, unspecified osteoarthritis type  M19 071    3  Chronic pain of right ankle  M25 571     G89 29    4  Right foot pain  M79 671                   Subjective:  Rogerio Camarillo reports that her foot doesn't feel too bad, it's better  She notes some mild soreness after her last visit which went away fairly quickly  Rogerio Camarillo notes that she is on her feet a lot so it has been hard to wean into wearing the orthotics because there is no opportunity to switch during her day  She notes wearing them infrequently  Objective: See treatment diary below      Assessment: Patient tolerated treatment well  Patient performed ex and received manual therapy as noted  + tenderness reported mid to distal R plantar fascia   + soft tissue restriction noted which improved with IASTM  Encouraged compliance with HEP, orthotic weaning and cold pack/water bottle use at home  Patient would benefit from continued PT intervention to address deficits and attain set goals  Plan: Continue per plan of care        Precautions: asthma, anxiety     Daily Treatment Diary      Assessment  2/28  3/7  3/21  3/28               Josephine/Abner BRYANT                     FOTO  54/65       **         **   Manuals    Mateo/BARRETT R Plantar Fascia    MD  CC  CC                                         Exercise Diary     Bike Warm-Up    L1x5'  L1 x6'  L1x7'                Longsitting Plantar Fascia Stretch with Towel  20"x3  30"x3  30"x3  30"x3                Seated Plantar Fascia Stretch  20"x3  30"x3  30'x3  30"x3                Golfball Plantar Fascia Massage  HEP                      Towel Toe Curls    x2  x2'  x2'                IV Isometrics - Ball    5"x10  0"L54  7"C08 EV Isometrics - Strap    5"x10  5"x15  5"x15                                                                                                                                                                                                                                       Modalities    CP R Ankle/Foot  Post-Tx    10'  10'  10'

## 2022-04-06 ENCOUNTER — OFFICE VISIT (OUTPATIENT)
Dept: INTERNAL MEDICINE CLINIC | Facility: OTHER | Age: 52
End: 2022-04-06
Payer: COMMERCIAL

## 2022-04-06 VITALS
SYSTOLIC BLOOD PRESSURE: 146 MMHG | WEIGHT: 211 LBS | TEMPERATURE: 98.6 F | HEIGHT: 65 IN | OXYGEN SATURATION: 99 % | HEART RATE: 115 BPM | DIASTOLIC BLOOD PRESSURE: 80 MMHG | BODY MASS INDEX: 35.16 KG/M2

## 2022-04-06 DIAGNOSIS — R68.89 FLU-LIKE SYMPTOMS: Primary | ICD-10-CM

## 2022-04-06 PROCEDURE — 87636 SARSCOV2 & INF A&B AMP PRB: CPT | Performed by: NURSE PRACTITIONER

## 2022-04-06 PROCEDURE — 99213 OFFICE O/P EST LOW 20 MIN: CPT | Performed by: NURSE PRACTITIONER

## 2022-04-06 RX ORDER — OSELTAMIVIR PHOSPHATE 75 MG/1
75 CAPSULE ORAL 2 TIMES DAILY
Qty: 10 CAPSULE | Refills: 0 | Status: SHIPPED | OUTPATIENT
Start: 2022-04-06 | End: 2022-04-11

## 2022-04-06 NOTE — PROGRESS NOTES
Assessment/Plan:    Flu-like symptoms  Will start Tamiflu, will send COVID/flu to lab  If swab negative and patient still having symptoms will start Augmentin  Rest and fluids advised  Educated that the course of this illness could be 2-4 weeks  Discussed symptomatic relief, such as warm steam inhalations, tylenol/ibuprofen for fevers and body aches, rest, and drink plenty of fluids  Warm salt gargles for sore throat  Discussed red flag signs to go to the ER, such as chest pain or shortness of breath  Return to the office for reevaluation if symptoms worsen or do not improve in 1-2 weeks          Diagnoses and all orders for this visit:    Flu-like symptoms  -     oseltamivir (TAMIFLU) 75 mg capsule; Take 1 capsule (75 mg total) by mouth 2 (two) times a day for 5 days  -     Covid/Flu- Office Collect          Subjective:      Patient ID: Megan Gill is a 46 y o  female  Patient presents today with cold-like symptoms that abruptly started last evening  Patient denies any current sick contacts  Has had COVID vaccination however has not had flu vaccination  Highest fever 99 5  Taking tylenol          Cough  This is a new problem  The current episode started yesterday  The problem has been gradually worsening  Associated symptoms include chills, ear pain, a fever, headaches, myalgias and nasal congestion  Pertinent negatives include no chest pain, postnasal drip, rash, rhinorrhea, sore throat, shortness of breath or wheezing  Nothing aggravates the symptoms  Treatments tried: Tylenol  The following portions of the patient's history were reviewed and updated as appropriate: allergies, current medications, past family history, past medical history, past social history, past surgical history and problem list     Review of Systems   Constitutional: Positive for chills and fever  Negative for activity change, appetite change and diaphoresis  HENT: Positive for congestion and ear pain   Negative for ear discharge, postnasal drip, rhinorrhea, sinus pressure, sinus pain and sore throat  Eyes: Negative for pain, discharge, itching and visual disturbance  Respiratory: Positive for cough  Negative for chest tightness, shortness of breath and wheezing  Cardiovascular: Negative for chest pain, palpitations and leg swelling  Gastrointestinal: Negative for abdominal pain, constipation, diarrhea, nausea and vomiting  Endocrine: Negative for polydipsia, polyphagia and polyuria  Genitourinary: Negative for difficulty urinating, dysuria and urgency  Musculoskeletal: Positive for myalgias  Negative for arthralgias, back pain and neck pain  Skin: Negative for rash and wound  Neurological: Positive for headaches  Negative for dizziness, weakness and numbness  Objective:      /80 (BP Location: Left arm, Patient Position: Sitting, Cuff Size: Adult)   Pulse (!) 115   Temp 98 6 °F (37 °C)   Ht 5' 5" (1 651 m)   Wt 95 7 kg (211 lb)   SpO2 99%   BMI 35 11 kg/m²          Physical Exam  Constitutional:       General: She is not in acute distress  Appearance: She is well-developed  She is ill-appearing  She is not diaphoretic  HENT:      Head: Normocephalic and atraumatic  Right Ear: External ear normal  A middle ear effusion is present  Tympanic membrane is not erythematous  Left Ear: External ear normal  A middle ear effusion is present  Tympanic membrane is not erythematous  Nose: Nose normal       Mouth/Throat:      Pharynx: No oropharyngeal exudate  Eyes:      General:         Right eye: No discharge  Left eye: No discharge  Conjunctiva/sclera: Conjunctivae normal       Pupils: Pupils are equal, round, and reactive to light  Neck:      Thyroid: No thyromegaly  Cardiovascular:      Rate and Rhythm: Normal rate and regular rhythm  Heart sounds: Normal heart sounds  No murmur heard  No friction rub  No gallop      Pulmonary:      Effort: Pulmonary effort is normal  No respiratory distress  Breath sounds: Normal breath sounds  No stridor  No wheezing or rales  Abdominal:      General: Bowel sounds are normal  There is no distension  Palpations: Abdomen is soft  Tenderness: There is no abdominal tenderness  Musculoskeletal:      Cervical back: Normal range of motion and neck supple  Lymphadenopathy:      Cervical: No cervical adenopathy  Skin:     General: Skin is warm and dry  Findings: No erythema or rash  Neurological:      Mental Status: She is alert and oriented to person, place, and time  Psychiatric:         Behavior: Behavior normal          Thought Content:  Thought content normal          Judgment: Judgment normal

## 2022-04-06 NOTE — ASSESSMENT & PLAN NOTE
Will start Tamiflu, will send COVID/flu to lab  If swab negative and patient still having symptoms will start Augmentin  Rest and fluids advised  Educated that the course of this illness could be 2-4 weeks  Discussed symptomatic relief, such as warm steam inhalations, tylenol/ibuprofen for fevers and body aches, rest, and drink plenty of fluids  Warm salt gargles for sore throat  Discussed red flag signs to go to the ER, such as chest pain or shortness of breath     Return to the office for reevaluation if symptoms worsen or do not improve in 1-2 weeks

## 2022-04-07 LAB
FLUAV RNA RESP QL NAA+PROBE: POSITIVE
FLUBV RNA RESP QL NAA+PROBE: NEGATIVE
SARS-COV-2 RNA RESP QL NAA+PROBE: NEGATIVE

## 2022-08-25 ENCOUNTER — TELEMEDICINE (OUTPATIENT)
Dept: INTERNAL MEDICINE CLINIC | Facility: CLINIC | Age: 52
End: 2022-08-25
Payer: COMMERCIAL

## 2022-08-25 VITALS — TEMPERATURE: 99.2 F

## 2022-08-25 DIAGNOSIS — Z12.11 ENCOUNTER FOR COLORECTAL CANCER SCREENING: Primary | ICD-10-CM

## 2022-08-25 DIAGNOSIS — U07.1 COVID-19: ICD-10-CM

## 2022-08-25 DIAGNOSIS — Z12.12 ENCOUNTER FOR COLORECTAL CANCER SCREENING: Primary | ICD-10-CM

## 2022-08-25 PROCEDURE — 99213 OFFICE O/P EST LOW 20 MIN: CPT | Performed by: NURSE PRACTITIONER

## 2022-08-25 RX ORDER — NIRMATRELVIR AND RITONAVIR 300-100 MG
3 KIT ORAL 2 TIMES DAILY
Qty: 30 TABLET | Refills: 0 | Status: SHIPPED | OUTPATIENT
Start: 2022-08-25 | End: 2022-08-30

## 2022-08-25 NOTE — PROGRESS NOTES
COVID-19 Outpatient Progress Note    Assessment/Plan:    Problem List Items Addressed This Visit    None     Visit Diagnoses     Encounter for colorectal cancer screening    -  Primary    Relevant Orders    Ambulatory referral for colonoscopy    COVID-19        Relevant Medications    nirmatrelvir & ritonavir (Paxlovid, 300/100,) tablet therapy pack         Disposition:     Patient has asymptomatic or mild COVID-19 infection  Based off CDC guidelines, they were recommended to isolate for 5 days  If they are asymptomatic or symptoms are improving with no fevers in the past 24 hours, isolation may be ended followed by 5 days of wearing a mask when around othes to minimize risk of infecting others  If still have a fever or other symptoms have not improved, continue to isolate until they improve  Regardless of when they end isolation, avoid being around people who are more likely to get very sick from COVID-19 until at least day 11  Discussed symptom directed medication options with patient  Discussed vitamin D, vitamin C, and/or zinc supplementation with patient  Rest and fluids advised  Educated that the course of this illness could be 2-4 weeks  Discussed symptomatic relief, such as warm steam inhalations, tylenol/ibuprofen for fevers and body aches, rest, and drink plenty of fluids  Warm salt gargles for sore throat  Discussed red flag signs to go to the ER, such as chest pain or shortness of breath  Return to the office for reevaluation if symptoms worsen or do not improve in 1-2 weeks '      Patient meets criteria for PAXLOVID and they have been counseled appropriately according to EUA documentation released by the FDA  After discussion, patient agrees to treatment      [address] is an investigational medicine used to treat mild-to-moderate COVID-19 in adults and children (15years of age and older weighing at least 80 pounds (40 kg)) with positive results of direct SARS-CoV-2 viral testing, and who are at high risk for progression to severe COVID-19, including hospitalization or death  PAXLOVID is investigational because it is still being studied  There is limited information about the safety and effectiveness of using PAXLOVID to treat people with mild-to-moderate COVID-19  The FDA has authorized the emergency use of PAXLOVID for the treatment of mild-tomoderate COVID-19 in adults and children (15years of age and older weighing at least 80 pounds (40 kg)) with a positive test for the virus that causes COVID-19, and who are at high risk for progression to severe COVID-19, including hospitalization or death, under an EUA  What should I tell my healthcare provider before I take PAXLOVID? Tell your healthcare provider if you:  - Have any allergies  - Have liver or kidney disease  - Are pregnant or plan to become pregnant  - Are breastfeeding a child  - Have any serious illnesses    Tell your healthcare provider about all the medicines you take, including prescription and over-the-counter medicines, vitamins, and herbal supplements  Some medicines may interact with PAXLOVID and may cause serious side effects  Keep a list of your medicines to show your healthcare provider and pharmacist when you get a new medicine  You can ask your healthcare provider or pharmacist for a list of medicines that interact with PAXLOVID  Do not start taking a new medicine without telling your healthcare provider  Your healthcare provider can tell you if it is safe to take PAXLOVID with other medicines  Tell your healthcare provider if you are taking combined hormonal contraceptive  PAXLOVID may affect how your birth control pills work  Females who are able to become pregnant should use another effective alternative form of contraception or an additional barrier method of contraception  Talk to your healthcare provider if you have any questions about contraceptive methods that might be right for you      How do I take PAXLOVID? PAXLOVID consists of 2 medicines: nirmatrelvir and ritonavir  - Take 2 pink tablets of nirmatrelvir with 1 white tablet of ritonavir by mouth 2 times each day (in the morning and in the evening) for 5 days  For each dose, take all 3 tablets at the same time  - If you have kidney disease, talk to your healthcare provider  You may need a different dose  - Swallow the tablets whole  Do not chew, break, or crush the tablets  - Take PAXLOVID with or without food  - Do not stop taking PAXLOVID without talking to your healthcare provider, even if you feel better  - If you miss a dose of PAXLOVID within 8 hours of the time it is usually taken, take it as soon as you remember  If you miss a dose by more than 8 hours, skip the missed dose and take the next dose at your regular time  Do not take 2 doses of PAXLOVID at the same time  - If you take too much PAXLOVID, call your healthcare provider or go to the nearest hospital emergency room right away  - If you are taking a ritonavir- or cobicistat-containing medicine to treat hepatitis C or Human Immunodeficiency Virus (HIV), you should continue to take your medicine as prescribed by your healthcare provider   - Talk to your healthcare provider if you do not feel better or if you feel worse after 5 days  Who should generally not take PAXLOVID? Do not take PAXLOVID if:  You are allergic to nirmatrelvir, ritonavir, or any of the ingredients in PAXLOVID      You are taking any of the following medicines:  - Alfuzosin  - Pethidine, piroxicam, propoxyphene  - Ranolazine  - Amiodarone, dronedarone, flecainide, propafenone, quinidine  - Colchicine  - Lurasidone, pimozide, clozapine  - Dihydroergotamine, ergotamine, methylergonovine  - Lovastatin, simvastatin  - Sildenafil (Revatio®) for pulmonary arterial hypertension (PAH)  - Triazolam, oral midazolam  - Apalutamide  - Carbamazepine, phenobarbital, phenytoin  - Rifampin  - St  Marcelluss Wort (hypericum perforatum)    What are the important possible side effects of PAXLOVID? Possible side effects of PAXLOVID are:  - Liver Problems  Tell your healthcare provider right away if you have any of these signs and symptoms of liver problems: loss of appetite, yellowing of your skin and the whites of eyes (jaundice), dark-colored urine, pale colored stools and itchy skin, stomach area (abdominal) pain  - Resistance to HIV Medicines  If you have untreated HIV infection, PAXLOVID may lead to some HIV medicines not working as well in the future  - Other possible side effects include: altered sense of taste, diarrhea, high blood pressure, or muscle aches    These are not all the possible side effects of PAXLOVID  Not many people have taken PAXLOVID  Serious and unexpected side effects may happen  [place] is still being studied, so it is possible that all of the risks are not known at this time  What other treatment choices are there? Like Delaney Negus may allow for the emergency use of other medicines to treat people with COVID-19  Go to https://[employer]/ for information on the emergency use of other medicines that are authorized by FDA to treat people with COVID-19  Your healthcare provider may talk with you about clinical trials for which you may be eligible  It is your choice to be treated or not to be treated with PAXLOVID  Should you decide not to receive it or for your child not to receive it, it will not change your standard medical care  What if I am pregnant or breastfeeding? There is no experience treating pregnant women or breastfeeding mothers with PAXLOVID  For a mother and unborn baby, the benefit of taking PAXLOVID may be greater than the risk from the treatment  If you are pregnant, discuss your options and specific situation with your healthcare provider      It is recommended that you use effective barrier contraception or do not have sexual activity while taking PAXLOVID  If you are breastfeeding, discuss your options and specific situation with your healthcare provider  How do I report side effects with PAXLOVID? Contact your healthcare provider if you have any side effects that bother you or do not go away  Report side effects to FDA MedWatch at www fda gov/medwatch or call 7-129-ZNT0250 or you can report side effects to [employer]  at the contact information provided below  Website Fax number Telephone number   [facility] [phone]     How should I store Ruth Marc? Store PAXLOVID tablets at room temperature between 68°F to 77°F (20°C to 25°C)  Full fact sheet for patients, parents, and caregivers can be found at: [employer]    I have spent 15 minutes directly with the patient  Encounter provider: SHARON Lino     Provider located at: [address] [address]     Recent Visits  No visits were found meeting these conditions  Showing recent visits within past 7 days and meeting all other requirements  Today's Visits  Date Type Provider Dept   08/25/22 [facility] SHARON Ulloa [facility]   Showing today's visits and meeting all other requirements  Future Appointments  No visits were found meeting these conditions  Showing future appointments within next 150 days and meeting all other requirements     This virtual check-in was done via telephone and she agrees to proceed  Patient agrees to participate in a virtual check in via telephone or video visit instead of presenting to the office to address urgent/immediate medical needs  Patient is aware this is a billable service   She acknowledged consent and understanding of privacy and security of the video platform  The patient has agreed to participate and understands they can discontinue the visit at any time  After connecting through Telephone, the patient was identified by name and date of birth  Chica Mcmahon was informed that this was a telemedicine visit and that the exam was being conducted confidentially over secure lines  My office door was closed  No one else was in the room  Chica Mcmahon acknowledged consent and understanding of privacy and security of the telemedicine visit  I informed the patient that I have reviewed her record in Epic and presented the opportunity for her to ask any questions regarding the visit today  The patient agreed to participate  Verification of patient location:  Patient is located in the following state in which I hold an active license: [place]    Subjective:   Chica Mcmahon is a 46 y o  female who has been screened for COVID-19  Symptom change since last report: unchanged  Patient's symptoms include fever (101 4, broke in the middle of the night ), chills, fatigue, nasal congestion, cough (mild ), myalgias and headache  Patient denies malaise, rhinorrhea, sore throat, anosmia, loss of taste, shortness of breath, chest tightness, abdominal pain, nausea, vomiting and diarrhea  - Date of symptom onset: 8/24/2022  - Date of exposure: 8/21/2022  - Date of positive COVID-19 test: 8/24/2022  Type of test: Home antigen  COVID-19 vaccination status: Fully vaccinated (primary series)    Arnulfo Cisneros has been staying home and has isolated themselves in her home  She is taking care to not share personal items and is cleaning all surfaces that are touched often, like counters, tabletops, and doorknobs using household cleaning sprays or wipes  She is wearing a mask when she leaves her room       Ibuprofen     Lab Results   Component Value Date    SARSCOV2 Negative 04/06/2022    SARSCOV2 Not Detected 01/15/2021     Past Medical History:   Diagnosis Date    Anxiety     Asthma      Past Surgical History:   Procedure Laterality Date    WISDOM TOOTH EXTRACTION       Current Outpatient Medications   Medication Sig Dispense Refill    nirmatrelvir & ritonavir (Paxlovid, 300/100,) tablet therapy pack Take 3 tablets by mouth 2 (two) times a day for 5 days Take 2 nirmatrelvir tablets + 1 ritonavir tablet together per dose 30 tablet 0    fluticasone (FLONASE) 50 mcg/act nasal spray 1 spray into each nostril daily 16 g 1     No current facility-administered medications for this visit  Allergies   Allergen Reactions    Keflex [Cephalexin] Diarrhea    Moxifloxacin GI Intolerance       Review of Systems   Constitutional: Positive for chills, fatigue and fever (101 4, broke in the middle of the night )  Negative for activity change, appetite change and diaphoresis  HENT: Positive for congestion  Negative for ear discharge, ear pain, postnasal drip, rhinorrhea, sinus pressure, sinus pain and sore throat  Eyes: Negative for pain, discharge, itching and visual disturbance  Respiratory: Positive for cough (mild )  Negative for chest tightness, shortness of breath and wheezing  Cardiovascular: Negative for chest pain, palpitations and leg swelling  Gastrointestinal: Negative for abdominal pain, constipation, diarrhea, nausea and vomiting  Endocrine: Negative for polydipsia, polyphagia and polyuria  Genitourinary: Negative for difficulty urinating, dysuria and urgency  Musculoskeletal: Positive for myalgias  Negative for arthralgias, back pain and neck pain  Skin: Negative for rash and wound  Neurological: Positive for headaches  Negative for dizziness, weakness and numbness  Objective:    Vitals:    08/25/22 0955   Temp: 99 2 °F (37 3 °C)       Physical Exam  Neurological:      Mental Status: She is alert and oriented to person, place, and time

## 2022-08-27 ENCOUNTER — NURSE TRIAGE (OUTPATIENT)
Dept: OTHER | Facility: OTHER | Age: 52
End: 2022-08-27

## 2022-08-27 NOTE — TELEPHONE ENCOUNTER
Regarding: COVID + worsening sinus congestion  ----- Message from Arianna Titus RN sent at 8/27/2022 10:46 AM EDT -----  "I have COVID since Wednesday but I feel like I have a sinus infection now   I'm wondering what I can do or if my doc would call in something?"

## 2022-08-27 NOTE — TELEPHONE ENCOUNTER
On call provider recommends home care and to call office Monday if s/s persist or worsen  Pt informed

## 2022-08-27 NOTE — TELEPHONE ENCOUNTER
Reason for Disposition   [1] Sinus congestion as part of a cold AND [2] present < 10 days    Answer Assessment - Initial Assessment Questions  1  LOCATION: "Where does it hurt?"       Pressure behind eyes    2  ONSET: "When did the sinus pain start?"  (e g , hours, days)       8/26    3  SEVERITY: "How bad is the pain?"   (Scale 1-10; mild, moderate or severe)    - MILD (1-3): doesn't interfere with normal activities     - MODERATE (4-7): interferes with normal activities (e g , work or school) or awakens from sleep    - SEVERE (8-10): excruciating pain and patient unable to do any normal activities         Rates pressure 6/10     4  RECURRENT SYMPTOM: "Have you ever had sinus problems before?" If Yes, ask: "When was the last time?" and "What happened that time?"       previously dx with sinus infection, given abx    5  NASAL CONGESTION: "Is the nose blocked?" If Yes, ask: "Can you open it or must you breathe through the mouth?"      Congestion, able to breathe through nose    6  NASAL DISCHARGE: "Do you have discharge from your nose?" If so ask, "What color?"      Yellow    7  FEVER: "Do you have a fever?" If Yes, ask: "What is it, how was it measured, and when did it start?"       Intermittent    8   OTHER SYMPTOMS: "Do you have any other symptoms?" (e g , sore throat, cough, earache, difficulty breathing)      Productive cough    Protocols used: SINUS PAIN OR CONGESTION-ADULT-

## 2022-09-02 ENCOUNTER — VBI (OUTPATIENT)
Dept: ADMINISTRATIVE | Facility: OTHER | Age: 52
End: 2022-09-02

## 2022-09-13 ENCOUNTER — OFFICE VISIT (OUTPATIENT)
Dept: INTERNAL MEDICINE CLINIC | Facility: OTHER | Age: 52
End: 2022-09-13
Payer: COMMERCIAL

## 2022-09-13 VITALS
TEMPERATURE: 98.3 F | HEIGHT: 65 IN | WEIGHT: 214 LBS | OXYGEN SATURATION: 99 % | BODY MASS INDEX: 35.65 KG/M2 | HEART RATE: 89 BPM | DIASTOLIC BLOOD PRESSURE: 86 MMHG | SYSTOLIC BLOOD PRESSURE: 136 MMHG

## 2022-09-13 DIAGNOSIS — Z12.11 ENCOUNTER FOR SCREENING FOR MALIGNANT NEOPLASM OF COLON: ICD-10-CM

## 2022-09-13 DIAGNOSIS — L23.9 ALLERGIC DERMATITIS: Primary | ICD-10-CM

## 2022-09-13 PROCEDURE — 99213 OFFICE O/P EST LOW 20 MIN: CPT | Performed by: STUDENT IN AN ORGANIZED HEALTH CARE EDUCATION/TRAINING PROGRAM

## 2022-09-13 RX ORDER — METHYLPREDNISOLONE 4 MG/1
TABLET ORAL
Qty: 21 EACH | Refills: 0 | Status: SHIPPED | OUTPATIENT
Start: 2022-09-13 | End: 2022-09-20

## 2022-09-13 NOTE — PROGRESS NOTES
[facility] PRIMARY CARE - [facility]   ASSESSMENT/PLAN:  Diagnoses and all orders for this visit:    Allergic dermatitis  · Last week patient admits utilizing new hairspray and subsequently developed pruritic facial and scalp rash with wheals  · Denies any additional new topical or environmental exposures or new medications   · Clinical story and rash appearance consistent with allergic dermatitis   · Will begin on Medrol dose pack; script sent to pharmacy   · May also take OTC Benadryl, Claritin, or Zyrtec   · Instructed to call office if rash persist or worsens   -     methylPREDNISolone 4 MG tablet therapy pack; Use as directed on package    Encounter for screening for malignant neoplasm of colon  · Ambulatory referral for colonoscopy; Future    CHIEF COMPLAINT: Allergic Reaction     HISTORY OF PRESENT ILLNESS:  Ms Traci Wu is a 46year old female with no significant PMHx who presents to the office today, 9/13/2022, for a same day appointment with concern for possible allergic reaction to her hairspray  Patient states that early last week she began utilizing a new hairspray  Midweek, she began to noticed new redness and wheal formation on her face, necks, and scalp  Rash non painful but pruritic  Admits to sensitive skin and admits to any additional new facial products, makeup, lotions, detergents etc  Further denies any significant sun, environmental, or occupational exposures  Denies any new medications or food allergies  Patient has tried OTC anti-itch medication but admits to persist in rash and minimal improvement in her symptoms  The following portions of the patient's history were reviewed and updated as appropriate: allergies, current medications, past family history, past medical history, past social history, past surgical history and problem list     Review of Systems   HENT: Negative for trouble swallowing      Respiratory: Negative for chest tightness, shortness of breath, wheezing and stridor  Skin: Positive for rash  Negative for color change, pallor and wound  Neurological: Negative for dizziness, weakness, light-headedness and headaches  OBJECTIVE:  Vitals:    09/13/22 1612   BP: 136/86   BP Location: Left arm   Patient Position: Sitting   Cuff Size: Adult   Pulse: 89   Temp: 98 3 °F (36 8 °C)   TempSrc: Temporal   SpO2: 99%   Weight: 97 1 kg (214 lb)   Height: 5' 5" (1 651 m)     Physical Exam  Constitutional:       General: She is not in acute distress  Appearance: She is normal weight  She is not ill-appearing or toxic-appearing  HENT:      Head: Normocephalic and atraumatic  Nose: Nose normal  No congestion  Mouth/Throat:      Mouth: Mucous membranes are moist       Pharynx: Oropharynx is clear  No oropharyngeal exudate  Eyes:      General: No scleral icterus  Conjunctiva/sclera: Conjunctivae normal    Cardiovascular:      Rate and Rhythm: Normal rate  Pulmonary:      Effort: Pulmonary effort is normal    Musculoskeletal:      Cervical back: Normal range of motion  Skin:     General: Skin is warm  Findings: Rash (Red wheals on b/l cheeks, forehead, neck, and throughout scalp) present  Neurological:      Mental Status: She is alert and oriented to person, place, and time     Psychiatric:         Mood and Affect: Mood normal          Behavior: Behavior normal            Current Outpatient Medications:     methylPREDNISolone 4 MG tablet therapy pack, Use as directed on package, Disp: 21 each, Rfl: 0    fluticasone (FLONASE) 50 mcg/act nasal spray, 1 spray into each nostril daily (Patient not taking: Reported on 9/13/2022), Disp: 16 g, Rfl: 1    Past Medical History:   Diagnosis Date    Anxiety     Asthma      Past Surgical History:   Procedure Laterality Date    WISDOM TOOTH EXTRACTION       Social History     Socioeconomic History    Marital status: Unknown     Spouse name: Not on file    Number of children: Not on file    Years of education: Not on file    Highest education level: Not on file   Occupational History    Not on file   Tobacco Use    Smoking status: Former Smoker     Packs/day: 0 50     Years: 10 00     Pack years: 5 00     Types: Cigarettes     Quit date: 2022     Years since quittin 0    Smokeless tobacco: Never Used   Vaping Use    Vaping Use: Never used   Substance and Sexual Activity    Alcohol use: Yes     Comment: Social    Drug use: Never    Sexual activity: Yes     Partners: Male   Other Topics Concern    Not on file   Social History Narrative    Not on file     Social Determinants of Health     Financial Resource Strain: Not on file   Food Insecurity: Not on file   Transportation Needs: Not on file   Physical Activity: Not on file   Stress: Not on file   Social Connections: Not on file   Intimate Partner Violence: Not on file   Housing Stability: Not on file     Family History   Problem Relation Age of Onset    Diabetes Mother     No Known Problems Father     No Known Problems Daughter     No Known Problems Maternal Grandmother     No Known Problems Maternal Grandfather     Breast cancer Paternal Grandmother 80    No Known Problems Paternal Grandfather     No Known Problems Maternal Aunt     Cancer Paternal Aunt     No Known Problems Paternal Aunt     No Known Problems Son     No Known Problems Son        ==  DO Tawnya Li 73 Internal Medicine PGY-3

## 2022-09-20 ENCOUNTER — TELEPHONE (OUTPATIENT)
Dept: INTERNAL MEDICINE CLINIC | Facility: OTHER | Age: 52
End: 2022-09-20

## 2022-09-20 DIAGNOSIS — L23.9 ALLERGIC DERMATITIS: Primary | ICD-10-CM

## 2022-09-20 RX ORDER — PREDNISONE 20 MG/1
TABLET ORAL
Qty: 23 TABLET | Refills: 0 | Status: SHIPPED | OUTPATIENT
Start: 2022-09-20 | End: 2022-10-04

## 2022-09-20 NOTE — TELEPHONE ENCOUNTER
Patient was seen last week for a rash on her face by Dr Archana Bolaños  Was prescribed prednisone but patient states that medication has not helped at all; face is still red and irritated  Wanted to see if there was anything else that can be ordered

## 2022-09-20 NOTE — TELEPHONE ENCOUNTER
Spoke with patient to which she reports the rash is not improved after a Medrol Dosepak  She denies any new detergents or recurrent exposure to the hair spray that may have caused her initial onset of symptoms  Will prescribe prednisone 60 mg daily for 4 days with taper  She is to contact our office for worsening symptoms  Recommended she take Benadryl as needed for pruritus

## 2022-10-11 PROBLEM — J06.9 VIRAL UPPER RESPIRATORY TRACT INFECTION: Status: RESOLVED | Noted: 2022-03-04 | Resolved: 2022-10-11

## 2022-10-27 ENCOUNTER — OFFICE VISIT (OUTPATIENT)
Dept: INTERNAL MEDICINE CLINIC | Facility: OTHER | Age: 52
End: 2022-10-27

## 2022-10-27 VITALS
HEIGHT: 65 IN | DIASTOLIC BLOOD PRESSURE: 80 MMHG | TEMPERATURE: 98 F | OXYGEN SATURATION: 98 % | SYSTOLIC BLOOD PRESSURE: 124 MMHG | WEIGHT: 220 LBS | BODY MASS INDEX: 36.65 KG/M2 | HEART RATE: 95 BPM

## 2022-10-27 DIAGNOSIS — M25.551 RIGHT HIP PAIN: ICD-10-CM

## 2022-10-27 DIAGNOSIS — M25.461 PAIN AND SWELLING OF RIGHT KNEE: Primary | ICD-10-CM

## 2022-10-27 DIAGNOSIS — Z12.11 ENCOUNTER FOR SCREENING FOR MALIGNANT NEOPLASM OF COLON: ICD-10-CM

## 2022-10-27 DIAGNOSIS — M25.561 PAIN AND SWELLING OF RIGHT KNEE: Primary | ICD-10-CM

## 2022-10-27 DIAGNOSIS — M25.50 POLYARTHRALGIA: ICD-10-CM

## 2022-10-27 DIAGNOSIS — Z12.31 ENCOUNTER FOR SCREENING MAMMOGRAM FOR MALIGNANT NEOPLASM OF BREAST: ICD-10-CM

## 2022-10-27 RX ORDER — METHYLPREDNISOLONE 4 MG/1
TABLET ORAL
Qty: 21 EACH | Refills: 0 | Status: SHIPPED | OUTPATIENT
Start: 2022-10-27

## 2022-10-27 NOTE — PROGRESS NOTES
Assessment/Plan:    Right hip pain  Will order an XR for further evaluation  Pain and swelling of right knee  Will order an XR for further evaluation  Will prescribe medrol dose pack  Recommended use of Voltaren gel  She is to contact office worsening symptoms  Diagnoses and all orders for this visit:    Pain and swelling of right knee  -     methylPREDNISolone 4 MG tablet therapy pack; Use as directed on package  -     XR knee 3 vw right non injury; Future    Right hip pain  -     XR hip/pelv 2-3 vws right if performed; Future    Polyarthralgia  -     Ambulatory Referral to Rheumatology; Future    Encounter for screening for malignant neoplasm of colon  -     Ambulatory referral for colonoscopy; Future    Encounter for screening mammogram for malignant neoplasm of breast  -     Mammo screening bilateral w 3d & cad; Future                Subjective:      Patient ID: Jacey Matias is a 46 y o  female  Chief Complaint   Patient presents with   • Leg Pain     R SIDE HIP & LEG PAIN       54-year-old female seen today with concern for right knee and right hip pain since 1 week  She denies any recent trauma or exertional activity  She is currently seeing Podiatry for degenerative disease of the right ankle to which she feels that she is favoring the right foot and therefore causing pain/symptoms of her right knee and hip  Leg Pain   Pertinent negatives include no numbness  The following portions of the patient's history were reviewed and updated as appropriate: allergies, current medications, past family history, past medical history, past social history, past surgical history and problem list     Review of Systems   Constitutional: Negative for activity change, appetite change, chills, diaphoresis, fatigue and fever  HENT: Negative for congestion, postnasal drip, rhinorrhea, sinus pressure, sinus pain, sneezing and sore throat  Eyes: Negative for visual disturbance     Respiratory: Negative for apnea, cough, choking, chest tightness, shortness of breath and wheezing  Cardiovascular: Negative for chest pain, palpitations and leg swelling  Gastrointestinal: Negative for abdominal distention, abdominal pain, anal bleeding, blood in stool, constipation, diarrhea, nausea and vomiting  Endocrine: Negative for cold intolerance and heat intolerance  Genitourinary: Negative for difficulty urinating, dysuria and hematuria  Musculoskeletal: Negative  Skin: Negative  Neurological: Negative for dizziness, weakness, light-headedness, numbness and headaches  Hematological: Negative for adenopathy  Psychiatric/Behavioral: Negative for agitation, sleep disturbance and suicidal ideas  All other systems reviewed and are negative          Past Medical History:   Diagnosis Date   • Anxiety    • Asthma          Current Outpatient Medications:   •  methylPREDNISolone 4 MG tablet therapy pack, Use as directed on package, Disp: 21 each, Rfl: 0  •  fluticasone (FLONASE) 50 mcg/act nasal spray, 1 spray into each nostril daily (Patient not taking: Reported on 10/27/2022), Disp: 16 g, Rfl: 1    Allergies   Allergen Reactions   • Keflex [Cephalexin] Diarrhea   • Moxifloxacin GI Intolerance       Social History   Past Surgical History:   Procedure Laterality Date   • WISDOM TOOTH EXTRACTION       Family History   Problem Relation Age of Onset   • Diabetes Mother    • No Known Problems Father    • No Known Problems Daughter    • No Known Problems Maternal Grandmother    • No Known Problems Maternal Grandfather    • Breast cancer Paternal Grandmother 80   • No Known Problems Paternal Grandfather    • No Known Problems Maternal Aunt    • Cancer Paternal Aunt    • No Known Problems Paternal Aunt    • No Known Problems Son    • No Known Problems Son        Objective:  /80 (BP Location: Left arm, Patient Position: Sitting, Cuff Size: Large)   Pulse 95   Temp 98 °F (36 7 °C) (Temporal)   Ht 5' 5" (1 651 m)   Wt 99 8 kg (220 lb)   SpO2 98% Comment: RA  BMI 36 61 kg/m²     No results found for this or any previous visit (from the past 1344 hour(s))  Physical Exam  Vitals and nursing note reviewed  Constitutional:       General: She is not in acute distress  Appearance: She is well-developed  She is not diaphoretic  HENT:      Head: Normocephalic and atraumatic  Eyes:      General:         Right eye: No discharge  Left eye: No discharge  Conjunctiva/sclera: Conjunctivae normal       Pupils: Pupils are equal, round, and reactive to light  Neck:      Thyroid: No thyromegaly  Vascular: No JVD  Cardiovascular:      Rate and Rhythm: Normal rate and regular rhythm  Heart sounds: Normal heart sounds  No murmur heard  No friction rub  No gallop  Pulmonary:      Effort: Pulmonary effort is normal  No respiratory distress  Breath sounds: Normal breath sounds  No wheezing or rales  Chest:      Chest wall: No tenderness  Abdominal:      General: There is no distension  Palpations: Abdomen is soft  Tenderness: There is no abdominal tenderness  Musculoskeletal:         General: No deformity  Normal range of motion  Cervical back: Normal range of motion and neck supple  Right knee: Swelling present  No bony tenderness  Tenderness present  Lymphadenopathy:      Cervical: No cervical adenopathy  Skin:     General: Skin is warm and dry  Coloration: Skin is not pale  Findings: No erythema or rash  Neurological:      Mental Status: She is alert and oriented to person, place, and time  Cranial Nerves: No cranial nerve deficit  Coordination: Coordination normal    Psychiatric:         Behavior: Behavior normal          Thought Content:  Thought content normal          Judgment: Judgment normal

## 2022-10-27 NOTE — ASSESSMENT & PLAN NOTE
Will order an XR for further evaluation  Will prescribe medrol dose pack  Recommended use of Voltaren gel  She is to contact office worsening symptoms

## 2022-11-12 ENCOUNTER — OFFICE VISIT (OUTPATIENT)
Dept: URGENT CARE | Age: 52
End: 2022-11-12

## 2022-11-12 VITALS
RESPIRATION RATE: 16 BRPM | OXYGEN SATURATION: 100 % | HEART RATE: 98 BPM | TEMPERATURE: 97.7 F | WEIGHT: 200 LBS | BODY MASS INDEX: 33.32 KG/M2 | HEIGHT: 65 IN

## 2022-11-12 DIAGNOSIS — T78.40XA ALLERGIC REACTION, INITIAL ENCOUNTER: Primary | ICD-10-CM

## 2022-11-12 RX ORDER — MELOXICAM 15 MG/1
15 TABLET ORAL DAILY
COMMUNITY

## 2022-11-12 RX ORDER — HYDROXYZINE 50 MG/1
50 TABLET, FILM COATED ORAL 3 TIMES DAILY PRN
Qty: 30 TABLET | Refills: 0 | Status: SHIPPED | OUTPATIENT
Start: 2022-11-12

## 2022-11-12 NOTE — PROGRESS NOTES
[facility]        NAME: Christina Leach is a 46 y o  female  : 1970    MRN: [id number]  DATE: 2022  TIME: 1:07 PM    Assessment and Plan   Allergic reaction, initial encounter Deidre Palm  1  Allergic reaction, initial encounter  hydrOXYzine HCL (ATARAX) 50 mg tablet         Patient Instructions       Follow up with PCP in 3-5 days  Proceed to  ER if symptoms worsen  Chief Complaint     Chief Complaint   Patient presents with   • Rash     Began to face yesterday    used hydrocortisone 2 5mg today    started meloxicam on Thursday for knee pain  Had rash similar 2 months ago    History of Present Illness       HPI   Patient presents with a rash on her face ongoing for the past day or so  Patient started Mobic on Thursday for her knee pain and this may be a trigger  Patient has been using hydrocortisone with minimal to no relief      Review of Systems   Review of Systems  Per HPI    Current Medications       Current Outpatient Medications:   •  hydrOXYzine HCL (ATARAX) 50 mg tablet, Take 1 tablet (50 mg total) by mouth 3 (three) times a day as needed for itching, Disp: 30 tablet, Rfl: 0  •  meloxicam (MOBIC) 15 mg tablet, Take 15 mg by mouth daily, Disp: , Rfl:   •  fluticasone (FLONASE) 50 mcg/act nasal spray, 1 spray into each nostril daily (Patient not taking: No sig reported), Disp: 16 g, Rfl: 1  •  methylPREDNISolone 4 MG tablet therapy pack, Use as directed on package (Patient not taking: Reported on 2022), Disp: 21 each, Rfl: 0    Current Allergies     Allergies as of 2022 - Reviewed 2022   Allergen Reaction Noted   • Keflex [cephalexin] Diarrhea 2020   • Moxifloxacin GI Intolerance 2013            The following portions of the patient's history were reviewed and updated as appropriate: allergies, current medications, past family history, past medical history, past social history, past surgical history and problem list      Past Medical History:   Diagnosis Date   • Anxiety    • Asthma        Past Surgical History:   Procedure Laterality Date   • WISDOM TOOTH EXTRACTION         Family History   Problem Relation Age of Onset   • Diabetes Mother    • No Known Problems Father    • No Known Problems Daughter    • No Known Problems Maternal Grandmother    • No Known Problems Maternal Grandfather    • Breast cancer Paternal Grandmother 80   • No Known Problems Paternal Grandfather    • No Known Problems Maternal Aunt    • Cancer Paternal Aunt    • No Known Problems Paternal Aunt    • No Known Problems Son    • No Known Problems Son          Medications have been verified  Objective   Pulse 98   Temp 97 7 °F (36 5 °C)   Resp 16   Ht 5' 5" (1 651 m)   Wt 90 7 kg (200 lb)   SpO2 100%   BMI 33 28 kg/m²   No LMP recorded  Patient is perimenopausal        Physical Exam     Physical Exam  Constitutional:       General: She is not in acute distress  Appearance: Normal appearance  HENT:      Head: Normocephalic  Nose: No congestion or rhinorrhea  Mouth/Throat:      Mouth: Mucous membranes are moist       Pharynx: No oropharyngeal exudate or posterior oropharyngeal erythema  Eyes:      General:         Right eye: No discharge  Left eye: No discharge  Conjunctiva/sclera: Conjunctivae normal    Cardiovascular:      Rate and Rhythm: Normal rate and regular rhythm  Pulses: Normal pulses  Pulmonary:      Effort: Pulmonary effort is normal  No respiratory distress  Abdominal:      General: Abdomen is flat  There is no distension  Palpations: Abdomen is soft  Tenderness: There is no abdominal tenderness  Musculoskeletal:      Cervical back: Neck supple  Skin:     General: Skin is warm  Capillary Refill: Capillary refill takes less than 2 seconds  Findings: Rash present        Comments: Erythematous rash over the face   Neurological:      Mental Status: She is alert and oriented to person, place, and time

## 2022-12-27 ENCOUNTER — TELEMEDICINE (OUTPATIENT)
Dept: INTERNAL MEDICINE CLINIC | Age: 52
End: 2022-12-27

## 2022-12-27 DIAGNOSIS — S90.32XA CONTUSION OF LEFT FOOT, INITIAL ENCOUNTER: ICD-10-CM

## 2022-12-27 DIAGNOSIS — J01.01 ACUTE RECURRENT MAXILLARY SINUSITIS: Primary | ICD-10-CM

## 2022-12-27 RX ORDER — AZITHROMYCIN 250 MG/1
TABLET, FILM COATED ORAL
Qty: 6 TABLET | Refills: 0 | Status: SHIPPED | OUTPATIENT
Start: 2022-12-27 | End: 2022-12-31

## 2022-12-27 RX ORDER — NAPROXEN 500 MG/1
500 TABLET ORAL 2 TIMES DAILY WITH MEALS
COMMUNITY
Start: 2022-11-17

## 2022-12-27 NOTE — PROGRESS NOTES
Virtual Regular Visit    Verification of patient location:    Patient is located in the following state in which I hold an active license [place]      Assessment/Plan:     1 Acute sinusitis  Acute sinusitis treated with Zithromax continue with the follow-up nasal rinse stop smoking    2 contusion of the foot keep the foot elevated ice on the foot if swelling redness increase the need to be seen in the office      Problem List Items Addressed This Visit             Reason for visit is   Chief Complaint   Patient presents with   • Virtual Brief Visit     Sx started Friday Sx: nasal congestion, headache, sneezing, sinus pressure, stuffy nose, cough- Pt states she tested for covid this morning- NEG   • HM     Pt states she is aware she needs to schedule colonoscopy- order in chart  Pt will scheduled mammo for jan or feb with [facility]        Encounter provider Byron Wagner MD    Provider located at [address] [place] [id number]      Recent Visits  No visits were found meeting these conditions  Showing recent visits within past 7 days and meeting all other requirements  Today's Visits  Date Type Provider Dept   12/27/22 Telemedicine Byron Wagner MD [facility]   Showing today's visits and meeting all other requirements  Future Appointments  No visits were found meeting these conditions  Showing future appointments within next 150 days and meeting all other requirements       The patient was identified by name and date of birth  Brittany Frank was informed that this is a telemedicine visit and that the visit is being conducted through the [facility]  She agrees to proceed     My office door was closed  No one else was in the room  She acknowledged consent and understanding of privacy and security of the video platform   The patient has agreed to participate and understands they can discontinue the visit at any time  Patient is aware this is a billable service  Subjective  Omkar Hodges is a 46 y o  female sinus problems    3 days history of cough which is less than congestion also sinus pain sneezing sinus congestion no fever or chills, she checked her COVID test which was negative she is a non-smoker and obese she denying any nausea vomiting diarrhea or dizziness she supposed to work today but she was not able to go  Yellowish to whitish nasal secretion and postnasal drip  No other complaints    Recently she hit her nose and now the foot is black and blue mostly the big toe and adjacent and at the base of the toes she have a pain no significant swelling  No bleeding or open area       Past Medical History:   Diagnosis Date   • Anxiety    • Asthma        Past Surgical History:   Procedure Laterality Date   • WISDOM TOOTH EXTRACTION         Current Outpatient Medications   Medication Sig Dispense Refill   • azithromycin (ZITHROMAX) 250 mg tablet Take 2 tablets today then 1 tablet daily x 4 days 6 tablet 0   • fluticasone (FLONASE) 50 mcg/act nasal spray 1 spray into each nostril daily 16 g 1   • hydrOXYzine HCL (ATARAX) 50 mg tablet Take 1 tablet (50 mg total) by mouth 3 (three) times a day as needed for itching 30 tablet 0   • meloxicam (MOBIC) 15 mg tablet Take 15 mg by mouth daily as needed     • naproxen (NAPROSYN) 500 mg tablet Take 500 mg by mouth 2 (two) times a day with meals     • methylPREDNISolone 4 MG tablet therapy pack Use as directed on package 21 each 0     No current facility-administered medications for this visit  Allergies   Allergen Reactions   • Keflex [Cephalexin] Diarrhea   • Moxifloxacin GI Intolerance       Review of Systems   Constitutional: Positive for fatigue  HENT: Positive for congestion, postnasal drip, rhinorrhea, sinus pressure, sinus pain and sneezing  Eyes: Negative  Respiratory: Positive for cough  Cardiovascular: Negative  Gastrointestinal: Negative  Endocrine: Negative  Video Exam    Vitals:       Physical Exam  Constitutional:       Appearance: She is well-developed  HENT:      Head: Normocephalic and atraumatic  Musculoskeletal:      Cervical back: Normal range of motion  Left foot: Decreased range of motion  Swelling present  Legs:       Comments: Swelling ecchymosis but the toes are moving on right   Neurological:      Mental Status: She is alert     Psychiatric:         Behavior: Behavior normal           I spent 15 minutes directly with the patient during this visit

## 2022-12-28 ENCOUNTER — TELEPHONE (OUTPATIENT)
Dept: ADMINISTRATIVE | Facility: OTHER | Age: 52
End: 2022-12-28

## 2022-12-28 NOTE — TELEPHONE ENCOUNTER
----- Message from Kelsey Viera sent at 12/27/2022  3:07 PM EST -----  Regarding: Care Gap Request  12/27/22 3:07 PM    Hello, our patient attached above has had Pap Smear (HPV) aka Cervical Cancer Screening completed/performed  Please assist in updating the patient chart by pulling the Care Everywhere (CE) document  The date of service is 12/10/2022       Thank you,  Arianne Hung  [facility] [address] [facility] [address]

## 2022-12-28 NOTE — TELEPHONE ENCOUNTER
Upon review of the In Basket request we were able to locate, review, and update the patient chart as requested for Pap Smear (HPV) aka Cervical Cancer Screening  Any additional questions or concerns should be emailed to the Practice Liaisons via the appropriate education email address, please do not reply via In Basket      Thank you  Bernard Grossman MA

## 2023-02-13 ENCOUNTER — VBI (OUTPATIENT)
Dept: ADMINISTRATIVE | Facility: OTHER | Age: 53
End: 2023-02-13

## 2023-02-23 ENCOUNTER — TELEMEDICINE (OUTPATIENT)
Dept: INTERNAL MEDICINE CLINIC | Facility: OTHER | Age: 53
End: 2023-02-23

## 2023-02-23 VITALS — HEIGHT: 65 IN | BODY MASS INDEX: 34.99 KG/M2 | WEIGHT: 210 LBS | TEMPERATURE: 98 F

## 2023-02-23 DIAGNOSIS — J01.91 ACUTE RECURRENT SINUSITIS, UNSPECIFIED LOCATION: Primary | ICD-10-CM

## 2023-02-23 RX ORDER — AMOXICILLIN AND CLAVULANATE POTASSIUM 875; 125 MG/1; MG/1
1 TABLET, FILM COATED ORAL EVERY 12 HOURS SCHEDULED
Qty: 14 TABLET | Refills: 0 | Status: SHIPPED | OUTPATIENT
Start: 2023-02-23 | End: 2023-03-02

## 2023-02-23 NOTE — PROGRESS NOTES
Virtual Regular Visit    Verification of patient location:    Patient is located in the following state in which I hold an active license [place]      Assessment/Plan:    Problem List Items Addressed This Visit    None  Visit Diagnoses     Acute recurrent sinusitis, unspecified location    -  Primary        Acute recurrent sinusitis  Congestion and cough x5 days with green mucus and sputum  Patient has history of recurrent sinusitis  Treating with flonase with minimal relief  She did not test for COVID and is adamant that this is not COVID so she is refusing testing (both home and POCT)  · POCT COVID test offered, patient declined   · Will treat with Augmentin 875-125 mg BID x7 days  · Continue flonase, can also do steam inhalation  · Can use mucinex, robitussin for cough  · COVID booster and flu shot when symptomatically improved         Reason for visit is   Chief Complaint   Patient presents with   • Sinus Pressure   • Sneezing   • Cough   • Cold Like Symptoms     Pt did not test for covid -- symptoms just started over the weekend    • Virtual Brief Visit   •      Pt due for mammo and colonoscopy -- patient is having a knee surgery most likely in April and would like to schedule both of these measures for after her surgery    • Virtual Regular Visit        Encounter provider Sal Villegas MD    Provider located at [address] [place] [address]      Recent Visits  No visits were found meeting these conditions  Showing recent visits within past 7 days and meeting all other requirements  Today's Visits  Date Type Provider Dept   02/23/23 Khushboo Dodson MD [facility]   Showing today's visits and meeting all other requirements  Future Appointments  No visits were found meeting these conditions    Showing future appointments within next 150 days and meeting all other requirements       The patient was identified by name and date of birth  Chucky Arreola was informed that this is a telemedicine visit and that the visit is being conducted through the [facility]  She agrees to proceed     My office door was closed  No one else was in the room  She acknowledged consent and understanding of privacy and security of the video platform  The patient has agreed to participate and understands they can discontinue the visit at any time  Patient is aware this is a billable service  Subjective  Chucky Arreola is a 46 y o  female with PMH of recurrent sinusitis who presents today for congestion and cough  Symptoms started on Saturday  She endorses sinus pressure congestion with green mucus and cough productive of green sputum  She denies fever, chills, or body ache  She denies GI symptoms, SOB, or chest pain  She reports this feels like her previous sinus infections  She has been vaccinated for COVID with 2 doses  No flu shot this year  She reports there is a cold going around at work  She denies any contact with COVID positive patient  She has not tested for COVID and is adamant she will not test as she does not think this is COVID  Offered POCT COVID test in office but she declined  HPI   As above    Past Medical History:   Diagnosis Date   • Anxiety    • Asthma        Past Surgical History:   Procedure Laterality Date   • WISDOM TOOTH EXTRACTION         Current Outpatient Medications   Medication Sig Dispense Refill   • fluticasone (FLONASE) 50 mcg/act nasal spray 1 spray into each nostril daily 16 g 1   • meloxicam (MOBIC) 15 mg tablet Take 15 mg by mouth daily as needed     • naproxen (NAPROSYN) 500 mg tablet Take 500 mg by mouth 2 (two) times a day with meals       No current facility-administered medications for this visit          Allergies   Allergen Reactions   • Keflex [Cephalexin] Diarrhea   • Moxifloxacin GI Intolerance       Review of Systems   Constitutional: Negative for appetite change, chills and fever  HENT: Positive for congestion and sinus pressure  Negative for sore throat  Respiratory: Positive for cough  Negative for shortness of breath  Cardiovascular: Negative for chest pain  Gastrointestinal: Negative for abdominal pain, diarrhea and nausea  Musculoskeletal: Negative for arthralgias and myalgias  Neurological: Negative for dizziness, light-headedness and headaches  Video Exam    Vitals:    02/23/23 0945   Temp: 98 °F (36 7 °C)   TempSrc: Oral   Weight: 95 3 kg (210 lb)   Height: 5' 5" (1 651 m)       Physical Exam  Constitutional:       General: She is not in acute distress  HENT:      Nose: Congestion present  Pulmonary:      Effort: No respiratory distress  Neurological:      Mental Status: She is alert  Psychiatric:         Mood and Affect: Mood normal          Speech: Speech normal          Behavior: Behavior normal  Behavior is cooperative            I spent 15 minutes directly with the patient during this visit

## 2023-02-23 NOTE — PATIENT INSTRUCTIONS
Please take the antibiotic as prescribed and complete the full course to avoid antibiotic resistance  You can use OTC medications as we discussed for symptom management  Please call back next week if not improved

## 2023-02-25 PROBLEM — J01.01 ACUTE RECURRENT MAXILLARY SINUSITIS: Status: RESOLVED | Noted: 2022-12-27 | Resolved: 2023-02-25

## 2023-03-16 ENCOUNTER — CONSULT (OUTPATIENT)
Dept: INTERNAL MEDICINE CLINIC | Facility: OTHER | Age: 53
End: 2023-03-16

## 2023-03-16 VITALS
OXYGEN SATURATION: 98 % | TEMPERATURE: 97.9 F | BODY MASS INDEX: 36.75 KG/M2 | SYSTOLIC BLOOD PRESSURE: 130 MMHG | HEIGHT: 65 IN | WEIGHT: 220.6 LBS | DIASTOLIC BLOOD PRESSURE: 84 MMHG | HEART RATE: 93 BPM

## 2023-03-16 DIAGNOSIS — Z13.220 SCREENING FOR LIPID DISORDERS: ICD-10-CM

## 2023-03-16 DIAGNOSIS — M17.11 PRIMARY OSTEOARTHRITIS OF RIGHT KNEE: Primary | ICD-10-CM

## 2023-03-16 DIAGNOSIS — Z01.818 PREOP EXAM FOR INTERNAL MEDICINE: ICD-10-CM

## 2023-03-16 DIAGNOSIS — E66.9 OBESITY (BMI 30-39.9): ICD-10-CM

## 2023-03-16 DIAGNOSIS — Z01.818 PREOPERATIVE TESTING: ICD-10-CM

## 2023-03-16 DIAGNOSIS — Z01.818 PRE-OPERATIVE CLEARANCE: ICD-10-CM

## 2023-03-16 DIAGNOSIS — Z72.0 TOBACCO ABUSE: ICD-10-CM

## 2023-03-16 PROBLEM — S90.32XA CONTUSION OF LEFT FOOT: Status: RESOLVED | Noted: 2022-12-27 | Resolved: 2023-03-16

## 2023-03-16 PROBLEM — L72.0 EPIDERMOID CYST: Status: RESOLVED | Noted: 2019-12-12 | Resolved: 2023-03-16

## 2023-03-16 PROBLEM — M17.12 PRIMARY OSTEOARTHRITIS OF LEFT KNEE: Status: ACTIVE | Noted: 2018-10-02

## 2023-03-16 PROBLEM — R68.89 FLU-LIKE SYMPTOMS: Status: RESOLVED | Noted: 2022-04-06 | Resolved: 2023-03-16

## 2023-03-16 PROBLEM — M25.50 POLYARTHRALGIA: Status: RESOLVED | Noted: 2021-12-29 | Resolved: 2023-03-16

## 2023-03-16 PROBLEM — M66.0 RUPTURED BAKERS CYST: Status: ACTIVE | Noted: 2022-11-07

## 2023-03-16 NOTE — PROGRESS NOTES
Subjective:    Muriel Shafer is a 46y o  year old female who presents to the office today for a preoperative consultation at the request of surgeon Dr Dany Gomez who plans on performing right total knee arthroplasty on March 30, 2023  Planned anesthesia: spinal  The patient has the following known anesthesia issues: she reports nausea coming out of anesthesia  Patients bleeding risk: no recent abnormal bleeding  Patient is able to walk 4 blocks without symptoms  Patient is able to walk up 2 flights of stairs without symptoms  Significant past medical history includes:    OA right knee - scheduled for TKA    Tobacco abuse - current daily smoker     Hx of bipolar disorder and panic - patient states this was diagnosed several years ago when she was going through different things in her life  She tells me this has been resolved and stable  She is not on any medications for these disorders  She denies any history of arrythmia, CAD, CAD with hx MI, CAD with recent PCI, CHF, chronic liver disease, acute hepatitis, coagulation delay, primary hypercoagulable state, pulmonary embolism, DVT, use of anticoagulants, diabetes, insulin use, thyroid disease, seizure disorder, CVA, asthma, COPD, LIAM, renal disease    Tobacco use: current smoker 1 ppd, smoked on and off most of her life  Alcohol use: 3-4 drinks per day  Illicit drug use: occasional marijuana use     Symptoms:   Easy bleeding: no  Easy bruising: yes  Frequent nose bleeds: no  Chest pain: no  Cough: no  Dyspnea on exertion:no  Edema: no  Palpitations: no  Wheezing: no    Living situation: Her boyfriend will be caring for her after surgery  The following portions of the patient's history were reviewed and updated as appropriate: allergies, current medications, past family history, past medical history, past social history, past surgical history and problem list     Review of Systems  Review of Systems   Constitutional: Negative for chills and fever  HENT: Negative for congestion, ear pain, sinus pressure, sinus pain and sore throat  Respiratory: Negative for cough, chest tightness, shortness of breath and wheezing  Cardiovascular: Negative for chest pain, palpitations and leg swelling  Gastrointestinal: Negative for diarrhea, nausea and vomiting  Genitourinary: Negative for difficulty urinating, dysuria, frequency, hematuria and urgency  Skin: Negative for rash  Neurological: Negative for dizziness, light-headedness and headaches  Past Medical History:   Diagnosis Date   • Anxiety    • Asthma    • Kidney stone 3/25/2013   • Nondependent alcohol abuse, episodic drinking behavior 5/6/2013   • Nondependent cannabis abuse 5/9/2011     Past Surgical History:   Procedure Laterality Date   • WISDOM TOOTH EXTRACTION       Social History     Tobacco Use   • Smoking status: Every Day     Packs/day: 0 50     Years: 10 00     Pack years: 5 00     Types: Cigarettes     Start date: 2012   • Smokeless tobacco: Never   Vaping Use   • Vaping Use: Never used   Substance Use Topics   • Alcohol use: Yes     Comment: Social   • Drug use: Never     Family History   Problem Relation Age of Onset   • Diabetes Mother    • No Known Problems Father    • No Known Problems Daughter    • No Known Problems Maternal Grandmother    • No Known Problems Maternal Grandfather    • Breast cancer Paternal Grandmother 80   • No Known Problems Paternal Grandfather    • No Known Problems Maternal Aunt    • Cancer Paternal Aunt    • No Known Problems Paternal Aunt    • No Known Problems Son    • No Known Problems Son      Keflex [cephalexin] and Moxifloxacin  Current Outpatient Medications   Medication Sig Dispense Refill   • naproxen (NAPROSYN) 500 mg tablet Take 500 mg by mouth 2 (two) times a day with meals       No current facility-administered medications for this visit         Objective:       /84 (BP Location: Left arm, Patient Position: Sitting, Cuff Size: Large) Pulse 93   Temp 97 9 °F (36 6 °C) (Temporal)   Ht 5' 5" (1 651 m)   Wt 100 kg (220 lb 9 6 oz)   SpO2 98%   BMI 36 71 kg/m²   Physical Exam  Vitals reviewed  Constitutional:       General: She is not in acute distress  Appearance: Normal appearance  She is obese  She is not diaphoretic  HENT:      Head: Normocephalic and atraumatic  Right Ear: Tympanic membrane and external ear normal       Left Ear: Tympanic membrane and external ear normal       Nose: Nose normal  No congestion or rhinorrhea  Mouth/Throat:      Mouth: Mucous membranes are moist       Pharynx: Oropharynx is clear  No oropharyngeal exudate or posterior oropharyngeal erythema  Eyes:      Extraocular Movements: Extraocular movements intact  Conjunctiva/sclera: Conjunctivae normal       Pupils: Pupils are equal, round, and reactive to light  Cardiovascular:      Rate and Rhythm: Normal rate and regular rhythm  Heart sounds: Normal heart sounds  No murmur heard  Pulmonary:      Effort: Pulmonary effort is normal  No respiratory distress  Breath sounds: Normal breath sounds  No wheezing, rhonchi or rales  Abdominal:      General: There is no distension  Palpations: Abdomen is soft  Tenderness: There is no abdominal tenderness  Musculoskeletal:      Right lower leg: No edema  Left lower leg: No edema  Skin:     General: Skin is warm and dry  Neurological:      Mental Status: She is alert and oriented to person, place, and time  Mental status is at baseline  Psychiatric:         Mood and Affect: Mood normal          Behavior: Behavior normal             Cardiographics  ECG: normal sinus rhythm, no blocks or conduction defects, no ischemic changes    Imaging  Chest x-ray:ordered    Lab Review   Ordered     Assessment:     46 y o  female with planned surgery as above      Known risk factors for perioperative complications: None  obesity, tobacco abuse       [facility] medical clearance is pending the results of her labs and CXR  If labs and CXR are normal, she will be cleared for surgery  Reevaluation needed if he should present with new symptoms prior to surgery  Plan:  Preoperative workup as follows: CXR, labs as ordered  Change in medication regimen before surgery: hold NSAIDs x 1 week prior to surgery   Assessment/Plan:    Problem List Items Addressed This Visit        Musculoskeletal and Integument    Primary osteoarthritis of right knee - Primary     - scheduled for right TKA 3/30 with Dr Dany Gomez             Other    Obesity (BMI 30-39 9)     - recommend healthy diet and routine exercise         Tobacco abuse     - current daily smoker  - discussed increased risk of delayed healing, strongly recommend to quit smoking prior to surgery          Relevant Orders    XR chest pa & lateral   Other Visit Diagnoses     Preop exam for internal medicine        - medical clearance pending review of labs and CXR   orders given today  - advised to hold NSAIDs x 1 week prior to surgery    Pre-operative clearance        Relevant Orders    POCT ECG (Completed)    Preoperative testing        Relevant Orders    CBC and differential    Comprehensive metabolic panel    XR chest pa & lateral    Protime-INR    APTT    Screening for lipid disorders        Relevant Orders    Lipid Panel with Direct LDL reflex

## 2023-03-16 NOTE — ASSESSMENT & PLAN NOTE
- current daily smoker  - discussed increased risk of delayed healing, strongly recommend to quit smoking prior to surgery

## 2023-03-17 ENCOUNTER — APPOINTMENT (OUTPATIENT)
Dept: LAB | Facility: IMAGING CENTER | Age: 53
End: 2023-03-17

## 2023-03-17 ENCOUNTER — HOSPITAL ENCOUNTER (OUTPATIENT)
Dept: RADIOLOGY | Facility: IMAGING CENTER | Age: 53
End: 2023-03-17

## 2023-03-17 DIAGNOSIS — Z01.818 PREOPERATIVE TESTING: ICD-10-CM

## 2023-03-17 DIAGNOSIS — Z72.0 TOBACCO ABUSE: ICD-10-CM

## 2023-03-17 DIAGNOSIS — Z13.220 SCREENING FOR LIPID DISORDERS: ICD-10-CM

## 2023-03-17 LAB
ALBUMIN SERPL BCP-MCNC: 4 G/DL (ref 3.5–5)
ALP SERPL-CCNC: 42 U/L (ref 46–116)
ALT SERPL W P-5'-P-CCNC: 20 U/L (ref 12–78)
ANION GAP SERPL CALCULATED.3IONS-SCNC: 2 MMOL/L (ref 4–13)
APTT PPP: 24 SECONDS (ref 23–37)
AST SERPL W P-5'-P-CCNC: 14 U/L (ref 5–45)
BASOPHILS # BLD AUTO: 0.04 THOUSANDS/ÂΜL (ref 0–0.1)
BASOPHILS NFR BLD AUTO: 1 % (ref 0–1)
BILIRUB SERPL-MCNC: 0.61 MG/DL (ref 0.2–1)
BUN SERPL-MCNC: 25 MG/DL (ref 5–25)
CALCIUM SERPL-MCNC: 9.5 MG/DL (ref 8.3–10.1)
CHLORIDE SERPL-SCNC: 110 MMOL/L (ref 96–108)
CHOLEST SERPL-MCNC: 174 MG/DL
CO2 SERPL-SCNC: 24 MMOL/L (ref 21–32)
CREAT SERPL-MCNC: 0.66 MG/DL (ref 0.6–1.3)
EOSINOPHIL # BLD AUTO: 0.18 THOUSAND/ÂΜL (ref 0–0.61)
EOSINOPHIL NFR BLD AUTO: 3 % (ref 0–6)
ERYTHROCYTE [DISTWIDTH] IN BLOOD BY AUTOMATED COUNT: 13.2 % (ref 11.6–15.1)
GFR SERPL CREATININE-BSD FRML MDRD: 101 ML/MIN/1.73SQ M
GLUCOSE P FAST SERPL-MCNC: 110 MG/DL (ref 65–99)
HCT VFR BLD AUTO: 46.6 % (ref 34.8–46.1)
HDLC SERPL-MCNC: 61 MG/DL
HGB BLD-MCNC: 15.1 G/DL (ref 11.5–15.4)
IMM GRANULOCYTES # BLD AUTO: 0.02 THOUSAND/UL (ref 0–0.2)
IMM GRANULOCYTES NFR BLD AUTO: 0 % (ref 0–2)
INR PPP: 0.93 (ref 0.84–1.19)
LDLC SERPL CALC-MCNC: 105 MG/DL (ref 0–100)
LYMPHOCYTES # BLD AUTO: 1.2 THOUSANDS/ÂΜL (ref 0.6–4.47)
LYMPHOCYTES NFR BLD AUTO: 19 % (ref 14–44)
MCH RBC QN AUTO: 31.1 PG (ref 26.8–34.3)
MCHC RBC AUTO-ENTMCNC: 32.4 G/DL (ref 31.4–37.4)
MCV RBC AUTO: 96 FL (ref 82–98)
MONOCYTES # BLD AUTO: 0.61 THOUSAND/ÂΜL (ref 0.17–1.22)
MONOCYTES NFR BLD AUTO: 10 % (ref 4–12)
NEUTROPHILS # BLD AUTO: 4.33 THOUSANDS/ÂΜL (ref 1.85–7.62)
NEUTS SEG NFR BLD AUTO: 67 % (ref 43–75)
NRBC BLD AUTO-RTO: 0 /100 WBCS
PLATELET # BLD AUTO: 304 THOUSANDS/UL (ref 149–390)
PMV BLD AUTO: 10.1 FL (ref 8.9–12.7)
POTASSIUM SERPL-SCNC: 4.5 MMOL/L (ref 3.5–5.3)
PROT SERPL-MCNC: 6.9 G/DL (ref 6.4–8.4)
PROTHROMBIN TIME: 12.6 SECONDS (ref 11.6–14.5)
RBC # BLD AUTO: 4.85 MILLION/UL (ref 3.81–5.12)
SODIUM SERPL-SCNC: 136 MMOL/L (ref 135–147)
TRIGL SERPL-MCNC: 41 MG/DL
WBC # BLD AUTO: 6.38 THOUSAND/UL (ref 4.31–10.16)

## 2023-03-21 PROBLEM — Z01.818 PREOP EXAM FOR INTERNAL MEDICINE: Status: ACTIVE | Noted: 2023-03-21

## 2023-03-21 NOTE — ASSESSMENT & PLAN NOTE
- medical clearance pending review of labs and CXR  orders given today  - advised to hold NSAIDs x 1 week prior to surgery    Addendum: Rachelle George is cleared from a medical standpoint to proceed with proposed surgery   - 3/21/23 3:26 pm

## 2023-06-26 DIAGNOSIS — M21.949 DEFORMITY OF HAND, UNSPECIFIED LATERALITY: Primary | ICD-10-CM

## 2023-06-26 DIAGNOSIS — Z12.39 BREAST SCREENING: ICD-10-CM

## 2023-06-26 DIAGNOSIS — Z12.12 ENCOUNTER FOR COLORECTAL CANCER SCREENING: ICD-10-CM

## 2023-06-26 DIAGNOSIS — M25.50 POLYARTHRALGIA: ICD-10-CM

## 2023-06-26 DIAGNOSIS — Z12.11 ENCOUNTER FOR COLORECTAL CANCER SCREENING: ICD-10-CM

## 2023-08-08 ENCOUNTER — APPOINTMENT (OUTPATIENT)
Dept: LAB | Facility: IMAGING CENTER | Age: 53
End: 2023-08-08
Payer: COMMERCIAL

## 2023-08-08 DIAGNOSIS — E55.9 VITAMIN D DEFICIENCY DISEASE: ICD-10-CM

## 2023-08-08 DIAGNOSIS — M79.641 PAIN IN BOTH HANDS: ICD-10-CM

## 2023-08-08 DIAGNOSIS — M79.642 PAIN IN BOTH HANDS: ICD-10-CM

## 2023-08-08 LAB
25(OH)D3 SERPL-MCNC: 11.5 NG/ML (ref 30–100)
B BURGDOR IGG+IGM SER QL IA: NEGATIVE
CRP SERPL QL: <3 MG/L
ERYTHROCYTE [SEDIMENTATION RATE] IN BLOOD: 4 MM/HOUR (ref 0–29)
RHEUMATOID FACT SER QL LA: NEGATIVE
URATE SERPL-MCNC: 5.1 MG/DL (ref 2–7.5)

## 2023-08-08 PROCEDURE — 86140 C-REACTIVE PROTEIN: CPT

## 2023-08-08 PROCEDURE — 36415 COLL VENOUS BLD VENIPUNCTURE: CPT

## 2023-08-08 PROCEDURE — 86200 CCP ANTIBODY: CPT

## 2023-08-08 PROCEDURE — 86225 DNA ANTIBODY NATIVE: CPT

## 2023-08-08 PROCEDURE — 86618 LYME DISEASE ANTIBODY: CPT

## 2023-08-08 PROCEDURE — 86038 ANTINUCLEAR ANTIBODIES: CPT

## 2023-08-08 PROCEDURE — 86430 RHEUMATOID FACTOR TEST QUAL: CPT

## 2023-08-08 PROCEDURE — 86235 NUCLEAR ANTIGEN ANTIBODY: CPT

## 2023-08-08 PROCEDURE — 84550 ASSAY OF BLOOD/URIC ACID: CPT

## 2023-08-08 PROCEDURE — 82306 VITAMIN D 25 HYDROXY: CPT

## 2023-08-08 PROCEDURE — 85652 RBC SED RATE AUTOMATED: CPT

## 2023-08-09 LAB — ANA SER QL IA: NEGATIVE

## 2023-08-10 LAB
DSDNA AB SER-ACNC: <1 IU/ML (ref 0–9)
ENA RNP AB SER-ACNC: <0.2 AI (ref 0–0.9)
ENA SCL70 AB SER-ACNC: <0.2 AI (ref 0–0.9)
ENA SM AB SER-ACNC: <0.2 AI (ref 0–0.9)
ENA SS-A AB SER-ACNC: <0.2 AI (ref 0–0.9)
ENA SS-B AB SER-ACNC: <0.2 AI (ref 0–0.9)

## 2023-08-11 LAB — CCP AB SER IA-ACNC: 1.2

## 2024-03-12 ENCOUNTER — TELEMEDICINE (OUTPATIENT)
Dept: INTERNAL MEDICINE CLINIC | Facility: OTHER | Age: 54
End: 2024-03-12
Payer: COMMERCIAL

## 2024-03-12 VITALS — HEIGHT: 65 IN | WEIGHT: 210 LBS | BODY MASS INDEX: 34.99 KG/M2

## 2024-03-12 DIAGNOSIS — U07.1 COVID-19: Primary | ICD-10-CM

## 2024-03-12 LAB
SARS-COV-2 AG UPPER RESP QL IA: POSITIVE
SL AMB POCT RAPID FLU A: NORMAL
SL AMB POCT RAPID FLU B: NORMAL
VALID CONTROL: ABNORMAL

## 2024-03-12 PROCEDURE — 87811 SARS-COV-2 COVID19 W/OPTIC: CPT | Performed by: INTERNAL MEDICINE

## 2024-03-12 PROCEDURE — 99213 OFFICE O/P EST LOW 20 MIN: CPT | Performed by: INTERNAL MEDICINE

## 2024-03-12 PROCEDURE — 87804 INFLUENZA ASSAY W/OPTIC: CPT | Performed by: INTERNAL MEDICINE

## 2024-03-12 NOTE — PROGRESS NOTES
Virtual Regular Visit    Verification of patient location:    Patient is located at Home in the following state in which I hold an active license [place]      Assessment/Plan:    Problem List Items Addressed This Visit    None  Visit Diagnoses       COVID-19    -  Primary    Relevant Orders    POCT rapid flu A and B (Completed)    POCT Rapid Covid Ag (Completed)          COVID infection  History of seasonal allergies. Presented to clinic and tested positive for COVID.  Symptoms started yesterday with fatigue that worsened today.  Reports headache, fatigue, myalgia, congestion, postnasal drip, allergy symptoms without any fever, productive cough, chest pain/tightness, shortness of breath, abdominal pain, diarrhea.  Denies any loss of taste or smell. Denies any recent travel or sick contacts.  Likely mild but denied.  Treat symptomatically with as needed Tylenol for pain and headache, increase fluid intake, Flonase and Claritin for allergic symptoms.  Patient was offered Plaxovid but she denied.   Discussed if her symptoms get worse such as fever, SOB, chest pain/tightness then to call the clinic or go to the ED. She understands and agrees.     Tobacco Cessation Counseling: Tobacco cessation counseling was provided. The patient is sincerely urged to quit consumption of tobacco. She is not ready to quit tobacco.         Reason for visit is   Chief Complaint   Patient presents with    COVID-19     Started Sunday night with Diarrhea.   Complains of congestion, body aches, fevers, headaches, and fatigue started yesterday             Encounter provider Nahid Pham DO    Provider located at [facility]  [address] [facility] [address]  [facility] [place] [address]      Recent Visits  No visits were found meeting these conditions.  Showing recent visits within past 7 days and meeting all other requirements  Today's Visits  Date Type Provider Dept   03/12/24  Telemedicine Nahid Pham DO [facility]   Showing today's visits and meeting all other requirements  Future Appointments  No visits were found meeting these conditions.  Showing future appointments within next 150 days and meeting all other requirements       The patient was identified by name and date of birth. Hanh Bernardo was informed that this is a telemedicine visit and that the visit is being conducted through the Epic Embedded platform. She agrees to proceed..  My office door was closed. No one else was in the room.  She acknowledged consent and understanding of privacy and security of the video platform. The patient has agreed to participate and understands they can discontinue the visit at any time.    Patient is aware this is a billable service.     Subjective  Hanh Bernardo is a 53 y.o. female with history of seasonal allergies but no other significant past medical history and currently not on any daily medication.  She presented as a same-day visit.  She was swabbed for COVID and tested positive.  She reports her symptoms started with fatigue yesterday and worsened since then.  Also mentions generalized myalgia, headache, allergy symptoms, congestion but denies any fever, productive cough, chest pain/tightness, shortness of breath, abdominal pain, diarrhea.  Denies any loss of taste or smell.  Denies any recent sick contacts or recent travel.  She has been taking Tylenol as needed with minimal relief.          Past Medical History:   Diagnosis Date    Anxiety     Asthma     Kidney stone 3/25/2013    Nondependent alcohol abuse, episodic drinking behavior 5/6/2013    Nondependent cannabis abuse 5/9/2011       Past Surgical History:   Procedure Laterality Date    WISDOM TOOTH EXTRACTION         Current Outpatient Medications   Medication Sig Dispense Refill    naproxen (NAPROSYN) 500 mg tablet Take 500 mg by mouth 2 (two) times a day with meals       No current  "facility-administered medications for this visit.        Allergies   Allergen Reactions    Keflex [Cephalexin] Diarrhea    Moxifloxacin GI Intolerance       Review of Systems   Constitutional:  Positive for fatigue. Negative for chills, fever and unexpected weight change.   HENT:  Positive for congestion and postnasal drip. Negative for sore throat.    Eyes:  Positive for pain.   Respiratory:  Negative for cough, chest tightness and shortness of breath.    Cardiovascular:  Negative for chest pain and palpitations.   Gastrointestinal:  Negative for abdominal pain, diarrhea, nausea and vomiting.   Neurological:  Positive for headaches. Negative for dizziness and weakness.   All other systems reviewed and are negative.      Video Exam    Vitals:    03/12/24 1432   Weight: 95.3 kg (210 lb)   Height: 5' 5\" (1.651 m)           Visit Time  Total Visit Duration: 35 minutes        "

## 2024-03-26 ENCOUNTER — TELEPHONE (OUTPATIENT)
Dept: INTERNAL MEDICINE CLINIC | Facility: OTHER | Age: 54
End: 2024-03-26

## 2024-03-26 NOTE — TELEPHONE ENCOUNTER
Pt called about getting Nicotine patches. Left message on machine to make appointment with dr to discuss smoking cessation options.

## 2024-04-26 ENCOUNTER — TELEPHONE (OUTPATIENT)
Dept: INTERNAL MEDICINE CLINIC | Facility: OTHER | Age: 54
End: 2024-04-26

## 2024-04-29 NOTE — TELEPHONE ENCOUNTER
Patient is having surgery in June sometime so she wanted to push they physical out.  She is scheduled for a physical on 8/12/24

## 2024-05-06 ENCOUNTER — OFFICE VISIT (OUTPATIENT)
Dept: INTERNAL MEDICINE CLINIC | Facility: OTHER | Age: 54
End: 2024-05-06
Payer: COMMERCIAL

## 2024-05-06 VITALS
OXYGEN SATURATION: 99 % | HEIGHT: 65 IN | BODY MASS INDEX: 36.49 KG/M2 | SYSTOLIC BLOOD PRESSURE: 130 MMHG | HEART RATE: 91 BPM | TEMPERATURE: 97.8 F | DIASTOLIC BLOOD PRESSURE: 82 MMHG | WEIGHT: 219 LBS

## 2024-05-06 DIAGNOSIS — Z13.6 ENCOUNTER FOR LIPID SCREENING FOR CARDIOVASCULAR DISEASE: ICD-10-CM

## 2024-05-06 DIAGNOSIS — E66.9 OBESITY (BMI 30-39.9): ICD-10-CM

## 2024-05-06 DIAGNOSIS — Z12.12 ENCOUNTER FOR COLORECTAL CANCER SCREENING: ICD-10-CM

## 2024-05-06 DIAGNOSIS — R23.2 HOT FLASHES: ICD-10-CM

## 2024-05-06 DIAGNOSIS — Z13.220 ENCOUNTER FOR LIPID SCREENING FOR CARDIOVASCULAR DISEASE: ICD-10-CM

## 2024-05-06 DIAGNOSIS — Z11.59 NEED FOR HEPATITIS C SCREENING TEST: ICD-10-CM

## 2024-05-06 DIAGNOSIS — F17.200 TOBACCO DEPENDENCE: ICD-10-CM

## 2024-05-06 DIAGNOSIS — Z12.39 BREAST SCREENING: ICD-10-CM

## 2024-05-06 DIAGNOSIS — E55.9 VITAMIN D DEFICIENCY: Primary | ICD-10-CM

## 2024-05-06 DIAGNOSIS — Z12.11 ENCOUNTER FOR COLORECTAL CANCER SCREENING: ICD-10-CM

## 2024-05-06 DIAGNOSIS — F31.70 BIPOLAR DISORDER IN PARTIAL REMISSION, MOST RECENT EPISODE UNSPECIFIED TYPE (HCC): ICD-10-CM

## 2024-05-06 PROCEDURE — 99214 OFFICE O/P EST MOD 30 MIN: CPT | Performed by: INTERNAL MEDICINE

## 2024-05-06 RX ORDER — NICOTINE 21 MG/24HR
1 PATCH, TRANSDERMAL 24 HOURS TRANSDERMAL EVERY 24 HOURS
Qty: 28 PATCH | Refills: 0 | Status: SHIPPED | OUTPATIENT
Start: 2024-05-06 | End: 2024-05-09 | Stop reason: SDUPTHER

## 2024-05-06 RX ORDER — ERGOCALCIFEROL 1.25 MG/1
CAPSULE ORAL
COMMUNITY
Start: 2024-04-29

## 2024-05-06 RX ORDER — TRAMADOL HYDROCHLORIDE 50 MG/1
TABLET ORAL
COMMUNITY
Start: 2024-04-08 | End: 2024-05-06

## 2024-05-06 NOTE — PROGRESS NOTES
Assessment/Plan:    Hot flashes  Continue to monitor clinically.    Bipolar disorder (HCC)  Stable.  Continue to monitor clinically.    Obesity (BMI 30-39.9)  Discussed diet and exercise.    Tobacco dependence  Nicotine patches ordered.  Discussed smoking cessation.    Vitamin D deficiency  Continue vitamin D supplementation.  Will recheck in 3 months.       Diagnoses and all orders for this visit:    Vitamin D deficiency  -     Vitamin D 25 hydroxy; Future    Tobacco dependence  -     nicotine (NICODERM CQ) 7 mg/24hr TD 24 hr patch; Place 1 patch on the skin over 24 hours every 24 hours for 28 days Do not start before June 4, 2024.  -     nicotine (NICODERM CQ) 14 mg/24hr TD 24 hr patch; Place 1 patch on the skin over 24 hours every 24 hours for 28 days    Encounter for colorectal cancer screening  -     Ambulatory Referral to Gastroenterology; Future    Breast screening  -     Mammo screening bilateral w 3d & cad; Future    Encounter for lipid screening for cardiovascular disease  -     Lipid panel; Future    Bipolar disorder in partial remission, most recent episode unspecified type (HCC)  -     CBC; Future  -     Comprehensive metabolic panel; Future    Hot flashes  -     CBC; Future  -     Comprehensive metabolic panel; Future  -     TSH, 3rd generation with Free T4 reflex; Future    Need for hepatitis C screening test  -     Hepatitis C antibody; Future    Obesity (BMI 30-39.9)    Other orders  -     Cholecalciferol 1.25 MG (42709 UT) capsule; Take 1 capsule by mouth once a week  -     ergocalciferol (VITAMIN D2) 50,000 units; take 1 capsule by mouth one time per week  -     Discontinue: traMADol (ULTRAM) 50 mg tablet; TAKE 1 TABLET (50 MG TOTAL) BY MOUTH EVERY 6 HOURS AS NEEDED FOR MODERATE PAIN (PAIN SCORE 4-6) (Patient not taking: Reported on 5/6/2024)                  Subjective:      Patient ID: Hanh Bernardo is a 53 y.o. female.    Chief Complaint   Patient presents with   • Other     Discuss new  medication requesting patches to quit smoking       Hanh Bernardo is seen today for follow up of chronic conditions.   Recent laboratory studies reviewed today with the patient.   she has been compliant with her medication regimen.   She is interested in smoking cessation as she will need to quit smoking prior to undergoing right hand surgery.  She was started on vitamin D/ergocalciferol for vitamin D deficiency.  she has no complaints or concerns at this time.           The following portions of the patient's history were reviewed and updated as appropriate: allergies, current medications, past family history, past medical history, past social history, past surgical history, and problem list.    Review of Systems   Constitutional:  Negative for activity change, appetite change, chills, diaphoresis, fatigue and fever.   HENT:  Negative for congestion, postnasal drip, rhinorrhea, sinus pressure, sinus pain, sneezing and sore throat.    Eyes:  Negative for visual disturbance.   Respiratory:  Negative for apnea, cough, choking, chest tightness, shortness of breath and wheezing.    Cardiovascular:  Negative for chest pain, palpitations and leg swelling.   Gastrointestinal:  Negative for abdominal distention, abdominal pain, anal bleeding, blood in stool, constipation, diarrhea, nausea and vomiting.   Endocrine: Negative for cold intolerance and heat intolerance.   Genitourinary:  Negative for difficulty urinating, dysuria and hematuria.   Musculoskeletal: Negative.    Skin: Negative.    Neurological:  Negative for dizziness, weakness, light-headedness, numbness and headaches.   Hematological:  Negative for adenopathy.   Psychiatric/Behavioral:  Negative for agitation, sleep disturbance and suicidal ideas.    All other systems reviewed and are negative.        Past Medical History:   Diagnosis Date   • Anxiety    • Asthma    • Kidney stone 3/25/2013   • Nondependent alcohol abuse, episodic drinking behavior 5/6/2013   •  "Nondependent cannabis abuse 5/9/2011         Current Outpatient Medications:   •  Cholecalciferol 1.25 MG (63514 UT) capsule, Take 1 capsule by mouth once a week, Disp: , Rfl:   •  ergocalciferol (VITAMIN D2) 50,000 units, take 1 capsule by mouth one time per week, Disp: , Rfl:   •  nicotine (NICODERM CQ) 14 mg/24hr TD 24 hr patch, Place 1 patch on the skin over 24 hours every 24 hours for 28 days, Disp: 28 patch, Rfl: 0  •  [START ON 6/4/2024] nicotine (NICODERM CQ) 7 mg/24hr TD 24 hr patch, Place 1 patch on the skin over 24 hours every 24 hours for 28 days Do not start before June 4, 2024., Disp: 28 patch, Rfl: 0    Allergies   Allergen Reactions   • Keflex [Cephalexin] Diarrhea   • Moxifloxacin GI Intolerance       Social History   Past Surgical History:   Procedure Laterality Date   • WISDOM TOOTH EXTRACTION       Family History   Problem Relation Age of Onset   • Diabetes Mother    • No Known Problems Father    • No Known Problems Daughter    • No Known Problems Maternal Grandmother    • No Known Problems Maternal Grandfather    • Breast cancer Paternal Grandmother 85   • No Known Problems Paternal Grandfather    • No Known Problems Maternal Aunt    • Cancer Paternal Aunt    • No Known Problems Paternal Aunt    • No Known Problems Son    • No Known Problems Son        Objective:  /82 (BP Location: Left arm, Patient Position: Sitting, Cuff Size: Standard)   Pulse 91   Temp 97.8 °F (36.6 °C) (Temporal)   Ht 5' 5\" (1.651 m)   Wt 99.3 kg (219 lb)   SpO2 99%   BMI 36.44 kg/m²     Recent Results (from the past 1344 hour(s))   POCT rapid flu A and B    Collection Time: 03/12/24  2:34 PM   Result Value Ref Range    RAPID FLU A Neg     RAPID FLU B Neg    POCT Rapid Covid Ag    Collection Time: 03/12/24  2:34 PM   Result Value Ref Range    POCT SARS-CoV-2 Ag Positive (A) Negative    VALID CONTROL Valid             Physical Exam  Vitals and nursing note reviewed.   Constitutional:       General: She is not in " acute distress.     Appearance: She is well-developed. She is not diaphoretic.   HENT:      Head: Normocephalic and atraumatic.   Eyes:      General:         Right eye: No discharge.         Left eye: No discharge.      Conjunctiva/sclera: Conjunctivae normal.      Pupils: Pupils are equal, round, and reactive to light.   Neck:      Thyroid: No thyromegaly.      Vascular: No JVD.   Cardiovascular:      Rate and Rhythm: Normal rate and regular rhythm.      Heart sounds: Normal heart sounds. No murmur heard.     No friction rub. No gallop.   Pulmonary:      Effort: Pulmonary effort is normal. No respiratory distress.      Breath sounds: Normal breath sounds. No wheezing or rales.   Chest:      Chest wall: No tenderness.   Abdominal:      General: There is no distension.      Palpations: Abdomen is soft.      Tenderness: There is no abdominal tenderness.   Musculoskeletal:         General: No tenderness or deformity. Normal range of motion.      Cervical back: Normal range of motion and neck supple.   Lymphadenopathy:      Cervical: No cervical adenopathy.   Skin:     General: Skin is warm and dry.      Coloration: Skin is not pale.      Findings: No erythema or rash.   Neurological:      Mental Status: She is alert and oriented to person, place, and time.      Cranial Nerves: No cranial nerve deficit.      Coordination: Coordination normal.   Psychiatric:         Behavior: Behavior normal.         Thought Content: Thought content normal.         Judgment: Judgment normal.

## 2024-05-08 ENCOUNTER — TELEPHONE (OUTPATIENT)
Age: 54
End: 2024-05-08

## 2024-05-08 DIAGNOSIS — F17.200 TOBACCO DEPENDENCE: ICD-10-CM

## 2024-05-08 NOTE — TELEPHONE ENCOUNTER
Patient called back to follow up on her previous call for the nicotine patches script . Advised her that request has been forwarded to clinical team and still being reviewed. She said she'll wait to hear back.

## 2024-05-08 NOTE — TELEPHONE ENCOUNTER
Pt called stating she was prescribed a nicotine patch. She picked up her prescription yesterday but she got 2 boxes of step 2 and 2 boxes of step 3. There was no step 1. She doesn't want to start without the step 1.    Please advise patient [phone]      Thank you

## 2024-05-09 RX ORDER — NICOTINE 21 MG/24HR
1 PATCH, TRANSDERMAL 24 HOURS TRANSDERMAL EVERY 24 HOURS
Qty: 28 PATCH | Refills: 0 | Status: SHIPPED | OUTPATIENT
Start: 2024-05-09 | End: 2024-06-06

## 2024-05-09 NOTE — TELEPHONE ENCOUNTER
Patient called in wanting to get a call back in regards to her medication the nicotine patch. She hasn't had them, and has been trying to get them.     Please advise back out to patient, in regards to medication.

## 2024-05-10 NOTE — TELEPHONE ENCOUNTER
Please contact patient to inform her that she is to start nicotine 14 mg patches based on the current amount of cigarettes that she smokes daily.  After 1 month of 14 mg patches she is to reduce to 7 mg patches.

## 2024-05-14 ENCOUNTER — TELEPHONE (OUTPATIENT)
Age: 54
End: 2024-05-14

## 2024-05-14 DIAGNOSIS — F17.200 TOBACCO DEPENDENCE: Primary | ICD-10-CM

## 2024-05-14 NOTE — TELEPHONE ENCOUNTER
Pt call about nicotine patch for step two is currently not working and would like step one. Please feel free to reach out to PT thank you.

## 2024-05-15 RX ORDER — NICOTINE 21 MG/24HR
1 PATCH, TRANSDERMAL 24 HOURS TRANSDERMAL EVERY 24 HOURS
Qty: 28 PATCH | Refills: 0 | Status: SHIPPED | OUTPATIENT
Start: 2024-05-15

## 2024-06-19 ENCOUNTER — HOSPITAL ENCOUNTER (OUTPATIENT)
Dept: RADIOLOGY | Facility: IMAGING CENTER | Age: 54
Discharge: HOME | End: 2024-06-19
Payer: COMMERCIAL

## 2024-06-19 VITALS — WEIGHT: 219 LBS | HEIGHT: 65 IN | BODY MASS INDEX: 36.49 KG/M2

## 2024-06-19 DIAGNOSIS — Z12.39 BREAST SCREENING: ICD-10-CM

## 2024-06-19 PROCEDURE — 77063 BREAST TOMOSYNTHESIS BI: CPT

## 2024-06-19 PROCEDURE — 77067 SCR MAMMO BI INCL CAD: CPT

## 2024-06-25 ENCOUNTER — APPOINTMENT (OUTPATIENT)
Dept: LAB | Facility: IMAGING CENTER | Age: 54
End: 2024-06-25
Payer: COMMERCIAL

## 2024-06-25 DIAGNOSIS — Z87.891 SMOKING HISTORY: ICD-10-CM

## 2024-06-25 DIAGNOSIS — E55.9 VITAMIN D DEFICIENCY: ICD-10-CM

## 2024-06-25 LAB — 25(OH)D3 SERPL-MCNC: 24.6 NG/ML (ref 30–100)

## 2024-06-25 PROCEDURE — 82306 VITAMIN D 25 HYDROXY: CPT

## 2024-06-25 PROCEDURE — 36415 COLL VENOUS BLD VENIPUNCTURE: CPT

## 2024-06-25 PROCEDURE — 80307 DRUG TEST PRSMV CHEM ANLYZR: CPT

## 2024-06-27 LAB
COTININE, URINE: NEGATIVE NG/ML
Lab: NORMAL

## 2024-07-13 ENCOUNTER — APPOINTMENT (OUTPATIENT)
Dept: LAB | Facility: IMAGING CENTER | Age: 54
End: 2024-07-13
Payer: COMMERCIAL

## 2024-07-13 DIAGNOSIS — Z01.818 OTHER SPECIFIED PRE-OPERATIVE EXAMINATION: ICD-10-CM

## 2024-07-13 LAB — 25(OH)D3 SERPL-MCNC: 36.6 NG/ML (ref 30–100)

## 2024-07-13 PROCEDURE — 36415 COLL VENOUS BLD VENIPUNCTURE: CPT

## 2024-07-13 PROCEDURE — 82306 VITAMIN D 25 HYDROXY: CPT

## 2024-08-22 ENCOUNTER — VBI (OUTPATIENT)
Dept: ADMINISTRATIVE | Facility: OTHER | Age: 54
End: 2024-08-22

## 2024-08-22 NOTE — TELEPHONE ENCOUNTER
08/22/24 8:26 AM     Chart reviewed for CRC: Colonoscopy ; nothing is submitted to the patient's insurance at this time.     Maddy Woods   [facility]

## 2025-02-13 ENCOUNTER — TELEPHONE (OUTPATIENT)
Dept: INTERNAL MEDICINE CLINIC | Facility: OTHER | Age: 55
End: 2025-02-13

## 2025-06-16 ENCOUNTER — TELEPHONE (OUTPATIENT)
Age: 55
End: 2025-06-16

## 2025-06-16 NOTE — TELEPHONE ENCOUNTER
Patient called wanting to know if PCP had any recommendations for couples counseling.  Please advise and contact patient.

## 2025-06-17 ENCOUNTER — VBI (OUTPATIENT)
Dept: ADMINISTRATIVE | Facility: OTHER | Age: 55
End: 2025-06-17

## 2025-06-17 NOTE — TELEPHONE ENCOUNTER
06/17/25 10:31 AM     Chart reviewed for CRC: Colonoscopy ; nothing is submitted to the patient's insurance at this time.     Ramon Garcia MA   [facility]

## 2025-06-19 NOTE — TELEPHONE ENCOUNTER
Called and spoke to patient.  Provided her [facility], [address], [address], [place] [address].  Phone [phone]. They are next to [facility] off [address].  They offer couples counseling according to website.  Pt will call them to see if they accept their insurances.  I also recommended their local [employer] if they are members.  Pt said he would not agree to that.  She will call [facility]

## 2025-06-19 NOTE — TELEPHONE ENCOUNTER
Unfortunately I do not know any marital counselors. Can you assist the patient with this request, offering a highly recommended martial counselor? Thank you